# Patient Record
Sex: MALE | Race: WHITE | NOT HISPANIC OR LATINO | Employment: OTHER | URBAN - METROPOLITAN AREA
[De-identification: names, ages, dates, MRNs, and addresses within clinical notes are randomized per-mention and may not be internally consistent; named-entity substitution may affect disease eponyms.]

---

## 2019-01-01 ENCOUNTER — APPOINTMENT (OUTPATIENT)
Dept: RADIOLOGY | Facility: MEDICAL CENTER | Age: 74
DRG: 189 | End: 2019-01-01
Attending: INTERNAL MEDICINE
Payer: COMMERCIAL

## 2019-01-01 ENCOUNTER — APPOINTMENT (OUTPATIENT)
Dept: RADIOLOGY | Facility: MEDICAL CENTER | Age: 74
DRG: 189 | End: 2019-01-01
Attending: HOSPITALIST
Payer: COMMERCIAL

## 2019-01-01 ENCOUNTER — APPOINTMENT (OUTPATIENT)
Dept: CARDIOLOGY | Facility: MEDICAL CENTER | Age: 74
DRG: 189 | End: 2019-01-01
Attending: HOSPITALIST
Payer: COMMERCIAL

## 2019-01-01 ENCOUNTER — HOSPITAL ENCOUNTER (INPATIENT)
Facility: MEDICAL CENTER | Age: 74
LOS: 3 days | DRG: 189 | End: 2019-10-07
Attending: INTERNAL MEDICINE | Admitting: INTERNAL MEDICINE
Payer: COMMERCIAL

## 2019-01-01 VITALS
DIASTOLIC BLOOD PRESSURE: 65 MMHG | TEMPERATURE: 95 F | BODY MASS INDEX: 18.48 KG/M2 | HEIGHT: 69 IN | WEIGHT: 124.78 LBS | SYSTOLIC BLOOD PRESSURE: 97 MMHG

## 2019-01-01 LAB
ALBUMIN SERPL BCP-MCNC: 3.3 G/DL (ref 3.2–4.9)
ALBUMIN/GLOB SERPL: 1 G/DL
ALP SERPL-CCNC: 126 U/L (ref 30–99)
ALT SERPL-CCNC: 12 U/L (ref 2–50)
ANION GAP SERPL CALC-SCNC: 13 MMOL/L (ref 0–11.9)
ANION GAP SERPL CALC-SCNC: 15 MMOL/L (ref 0–11.9)
ANION GAP SERPL CALC-SCNC: 18 MMOL/L (ref 0–11.9)
ANION GAP SERPL CALC-SCNC: 9 MMOL/L (ref 0–11.9)
AST SERPL-CCNC: 10 U/L (ref 12–45)
BASE EXCESS BLDA CALC-SCNC: -1 MMOL/L (ref -4–3)
BASE EXCESS BLDA CALC-SCNC: -1 MMOL/L (ref -4–3)
BASE EXCESS BLDA CALC-SCNC: 2 MMOL/L (ref -4–3)
BASE EXCESS BLDA CALC-SCNC: 4 MMOL/L (ref -4–3)
BASOPHILS # BLD AUTO: 0.1 % (ref 0–1.8)
BASOPHILS # BLD AUTO: 0.2 % (ref 0–1.8)
BASOPHILS # BLD AUTO: 0.3 % (ref 0–1.8)
BASOPHILS # BLD AUTO: 0.3 % (ref 0–1.8)
BASOPHILS # BLD: 0.02 K/UL (ref 0–0.12)
BASOPHILS # BLD: 0.02 K/UL (ref 0–0.12)
BASOPHILS # BLD: 0.04 K/UL (ref 0–0.12)
BASOPHILS # BLD: 0.06 K/UL (ref 0–0.12)
BILIRUB SERPL-MCNC: 0.4 MG/DL (ref 0.1–1.5)
BODY TEMPERATURE: 37 CENTIGRADE
BODY TEMPERATURE: ABNORMAL CENTIGRADE
BUN SERPL-MCNC: 13 MG/DL (ref 8–22)
BUN SERPL-MCNC: 16 MG/DL (ref 8–22)
BUN SERPL-MCNC: 16 MG/DL (ref 8–22)
BUN SERPL-MCNC: 18 MG/DL (ref 8–22)
CALCIUM SERPL-MCNC: 8.1 MG/DL (ref 8.5–10.5)
CALCIUM SERPL-MCNC: 8.4 MG/DL (ref 8.5–10.5)
CALCIUM SERPL-MCNC: 8.5 MG/DL (ref 8.5–10.5)
CALCIUM SERPL-MCNC: 8.6 MG/DL (ref 8.5–10.5)
CHLORIDE SERPL-SCNC: 90 MMOL/L (ref 96–112)
CHLORIDE SERPL-SCNC: 91 MMOL/L (ref 96–112)
CO2 SERPL-SCNC: 21 MMOL/L (ref 20–33)
CO2 SERPL-SCNC: 23 MMOL/L (ref 20–33)
CO2 SERPL-SCNC: 23 MMOL/L (ref 20–33)
CO2 SERPL-SCNC: 24 MMOL/L (ref 20–33)
CREAT SERPL-MCNC: 0.57 MG/DL (ref 0.5–1.4)
CREAT SERPL-MCNC: 0.65 MG/DL (ref 0.5–1.4)
CREAT SERPL-MCNC: 0.71 MG/DL (ref 0.5–1.4)
CREAT SERPL-MCNC: 0.8 MG/DL (ref 0.5–1.4)
EKG IMPRESSION: NORMAL
EOSINOPHIL # BLD AUTO: 0 K/UL (ref 0–0.51)
EOSINOPHIL # BLD AUTO: 0.01 K/UL (ref 0–0.51)
EOSINOPHIL NFR BLD: 0 % (ref 0–6.9)
EOSINOPHIL NFR BLD: 0.1 % (ref 0–6.9)
ERYTHROCYTE [DISTWIDTH] IN BLOOD BY AUTOMATED COUNT: 49.2 FL (ref 35.9–50)
ERYTHROCYTE [DISTWIDTH] IN BLOOD BY AUTOMATED COUNT: 49.9 FL (ref 35.9–50)
ERYTHROCYTE [DISTWIDTH] IN BLOOD BY AUTOMATED COUNT: 50.7 FL (ref 35.9–50)
ERYTHROCYTE [DISTWIDTH] IN BLOOD BY AUTOMATED COUNT: 51.1 FL (ref 35.9–50)
GLOBULIN SER CALC-MCNC: 3.2 G/DL (ref 1.9–3.5)
GLUCOSE SERPL-MCNC: 143 MG/DL (ref 65–99)
GLUCOSE SERPL-MCNC: 145 MG/DL (ref 65–99)
GLUCOSE SERPL-MCNC: 153 MG/DL (ref 65–99)
GLUCOSE SERPL-MCNC: 156 MG/DL (ref 65–99)
HCO3 BLDA-SCNC: 20 MMOL/L (ref 17–25)
HCO3 BLDA-SCNC: 21 MMOL/L (ref 17–25)
HCO3 BLDA-SCNC: 23 MMOL/L (ref 17–25)
HCO3 BLDA-SCNC: 26 MMOL/L (ref 17–25)
HCT VFR BLD AUTO: 24.8 % (ref 42–52)
HCT VFR BLD AUTO: 28.1 % (ref 42–52)
HCT VFR BLD AUTO: 28.2 % (ref 42–52)
HCT VFR BLD AUTO: 28.9 % (ref 42–52)
HGB BLD-MCNC: 8 G/DL (ref 14–18)
HGB BLD-MCNC: 9 G/DL (ref 14–18)
HGB BLD-MCNC: 9 G/DL (ref 14–18)
HGB BLD-MCNC: 9.3 G/DL (ref 14–18)
IMM GRANULOCYTES # BLD AUTO: 0.05 K/UL (ref 0–0.11)
IMM GRANULOCYTES # BLD AUTO: 0.15 K/UL (ref 0–0.11)
IMM GRANULOCYTES NFR BLD AUTO: 0.5 % (ref 0–0.9)
IMM GRANULOCYTES NFR BLD AUTO: 0.9 % (ref 0–0.9)
IMM GRANULOCYTES NFR BLD AUTO: 0.9 % (ref 0–0.9)
IMM GRANULOCYTES NFR BLD AUTO: 1 % (ref 0–0.9)
INHALED O2 FLOW RATE: 6 L/MIN (ref 2–10)
LACTATE BLD-SCNC: 1.3 MMOL/L (ref 0.5–2)
LV EJECT FRACT  99904: 55
LV EJECT FRACT MOD 2C 99903: 61.07
LV EJECT FRACT MOD 4C 99902: 53.42
LV EJECT FRACT MOD BP 99901: 55.39
LYMPHOCYTES # BLD AUTO: 0.3 K/UL (ref 1–4.8)
LYMPHOCYTES # BLD AUTO: 0.47 K/UL (ref 1–4.8)
LYMPHOCYTES # BLD AUTO: 0.6 K/UL (ref 1–4.8)
LYMPHOCYTES # BLD AUTO: 0.62 K/UL (ref 1–4.8)
LYMPHOCYTES NFR BLD: 2.8 % (ref 22–41)
LYMPHOCYTES NFR BLD: 3.1 % (ref 22–41)
LYMPHOCYTES NFR BLD: 3.5 % (ref 22–41)
LYMPHOCYTES NFR BLD: 3.8 % (ref 22–41)
MAGNESIUM SERPL-MCNC: 1.5 MG/DL (ref 1.5–2.5)
MAGNESIUM SERPL-MCNC: 1.7 MG/DL (ref 1.5–2.5)
MCH RBC QN AUTO: 24.2 PG (ref 27–33)
MCH RBC QN AUTO: 24.6 PG (ref 27–33)
MCH RBC QN AUTO: 25 PG (ref 27–33)
MCH RBC QN AUTO: 25 PG (ref 27–33)
MCHC RBC AUTO-ENTMCNC: 31.9 G/DL (ref 33.7–35.3)
MCHC RBC AUTO-ENTMCNC: 32 G/DL (ref 33.7–35.3)
MCHC RBC AUTO-ENTMCNC: 32.2 G/DL (ref 33.7–35.3)
MCHC RBC AUTO-ENTMCNC: 32.3 G/DL (ref 33.7–35.3)
MCV RBC AUTO: 75.8 FL (ref 81.4–97.8)
MCV RBC AUTO: 76.5 FL (ref 81.4–97.8)
MCV RBC AUTO: 77.5 FL (ref 81.4–97.8)
MCV RBC AUTO: 78.1 FL (ref 81.4–97.8)
MONOCYTES # BLD AUTO: 0.19 K/UL (ref 0–0.85)
MONOCYTES # BLD AUTO: 0.44 K/UL (ref 0–0.85)
MONOCYTES # BLD AUTO: 0.8 K/UL (ref 0–0.85)
MONOCYTES # BLD AUTO: 0.81 K/UL (ref 0–0.85)
MONOCYTES NFR BLD AUTO: 1.8 % (ref 0–13.4)
MONOCYTES NFR BLD AUTO: 2.5 % (ref 0–13.4)
MONOCYTES NFR BLD AUTO: 5.1 % (ref 0–13.4)
MONOCYTES NFR BLD AUTO: 5.3 % (ref 0–13.4)
NEUTROPHILS # BLD AUTO: 10 K/UL (ref 1.82–7.42)
NEUTROPHILS # BLD AUTO: 13.51 K/UL (ref 1.82–7.42)
NEUTROPHILS # BLD AUTO: 14.26 K/UL (ref 1.82–7.42)
NEUTROPHILS # BLD AUTO: 16.29 K/UL (ref 1.82–7.42)
NEUTROPHILS NFR BLD: 89.8 % (ref 44–72)
NEUTROPHILS NFR BLD: 90.4 % (ref 44–72)
NEUTROPHILS NFR BLD: 92.8 % (ref 44–72)
NEUTROPHILS NFR BLD: 94.6 % (ref 44–72)
NRBC # BLD AUTO: 0 K/UL
NRBC BLD-RTO: 0 /100 WBC
NT-PROBNP SERPL IA-MCNC: ABNORMAL PG/ML (ref 0–125)
PCO2 BLDA: 22.6 MMHG (ref 26–37)
PCO2 BLDA: 24.1 MMHG (ref 26–37)
PCO2 BLDA: 25.5 MMHG (ref 26–37)
PCO2 BLDA: 28.1 MMHG (ref 26–37)
PCO2 TEMP ADJ BLDA: 24.1 MMHG (ref 26–37)
PH BLDA: 7.55 [PH] (ref 7.4–7.5)
PH BLDA: 7.56 [PH] (ref 7.4–7.5)
PH BLDA: 7.57 [PH] (ref 7.4–7.5)
PH BLDA: 7.58 [PH] (ref 7.4–7.5)
PH TEMP ADJ BLDA: 7.55 [PH] (ref 7.4–7.5)
PHOSPHATE SERPL-MCNC: 2.9 MG/DL (ref 2.5–4.5)
PHOSPHATE SERPL-MCNC: 3.9 MG/DL (ref 2.5–4.5)
PLATELET # BLD AUTO: 398 K/UL (ref 164–446)
PLATELET # BLD AUTO: 456 K/UL (ref 164–446)
PLATELET # BLD AUTO: 481 K/UL (ref 164–446)
PLATELET # BLD AUTO: 510 K/UL (ref 164–446)
PMV BLD AUTO: 10.3 FL (ref 9–12.9)
PMV BLD AUTO: 10.9 FL (ref 9–12.9)
PMV BLD AUTO: 9 FL (ref 9–12.9)
PMV BLD AUTO: 9.6 FL (ref 9–12.9)
PO2 BLDA: 53.3 MMHG (ref 64–87)
PO2 BLDA: 54.1 MMHG (ref 64–87)
PO2 BLDA: 56.1 MMHG (ref 64–87)
PO2 BLDA: 58.4 MMHG (ref 64–87)
PO2 TEMP ADJ BLDA: 53.3 MMHG (ref 64–87)
POTASSIUM SERPL-SCNC: 3 MMOL/L (ref 3.6–5.5)
POTASSIUM SERPL-SCNC: 4 MMOL/L (ref 3.6–5.5)
POTASSIUM SERPL-SCNC: 4 MMOL/L (ref 3.6–5.5)
POTASSIUM SERPL-SCNC: 4.6 MMOL/L (ref 3.6–5.5)
PROCALCITONIN SERPL-MCNC: 7.53 NG/ML
PROT SERPL-MCNC: 6.5 G/DL (ref 6–8.2)
RBC # BLD AUTO: 3.2 M/UL (ref 4.7–6.1)
RBC # BLD AUTO: 3.6 M/UL (ref 4.7–6.1)
RBC # BLD AUTO: 3.72 M/UL (ref 4.7–6.1)
RBC # BLD AUTO: 3.78 M/UL (ref 4.7–6.1)
SAO2 % BLDA: 87.9 % (ref 93–99)
SAO2 % BLDA: 88.9 % (ref 93–99)
SAO2 % BLDA: 90.6 % (ref 93–99)
SAO2 % BLDA: 90.8 % (ref 93–99)
SODIUM SERPL-SCNC: 123 MMOL/L (ref 135–145)
SODIUM SERPL-SCNC: 124 MMOL/L (ref 135–145)
SODIUM SERPL-SCNC: 129 MMOL/L (ref 135–145)
SODIUM SERPL-SCNC: 131 MMOL/L (ref 135–145)
TROPONIN T SERPL-MCNC: 20 NG/L (ref 6–19)
TROPONIN T SERPL-MCNC: 23 NG/L (ref 6–19)
TROPONIN T SERPL-MCNC: 26 NG/L (ref 6–19)
WBC # BLD AUTO: 10.6 K/UL (ref 4.8–10.8)
WBC # BLD AUTO: 15 K/UL (ref 4.8–10.8)
WBC # BLD AUTO: 15.9 K/UL (ref 4.8–10.8)
WBC # BLD AUTO: 17.6 K/UL (ref 4.8–10.8)

## 2019-01-01 PROCEDURE — 92610 EVALUATE SWALLOWING FUNCTION: CPT

## 2019-01-01 PROCEDURE — 700111 HCHG RX REV CODE 636 W/ 250 OVERRIDE (IP): Performed by: HOSPITALIST

## 2019-01-01 PROCEDURE — A9270 NON-COVERED ITEM OR SERVICE: HCPCS | Performed by: HOSPITALIST

## 2019-01-01 PROCEDURE — 71275 CT ANGIOGRAPHY CHEST: CPT

## 2019-01-01 PROCEDURE — 71045 X-RAY EXAM CHEST 1 VIEW: CPT

## 2019-01-01 PROCEDURE — 83735 ASSAY OF MAGNESIUM: CPT

## 2019-01-01 PROCEDURE — 770022 HCHG ROOM/CARE - ICU (200)

## 2019-01-01 PROCEDURE — 99291 CRITICAL CARE FIRST HOUR: CPT | Performed by: HOSPITALIST

## 2019-01-01 PROCEDURE — 84100 ASSAY OF PHOSPHORUS: CPT

## 2019-01-01 PROCEDURE — 700105 HCHG RX REV CODE 258: Performed by: HOSPITALIST

## 2019-01-01 PROCEDURE — C9113 INJ PANTOPRAZOLE SODIUM, VIA: HCPCS | Performed by: HOSPITALIST

## 2019-01-01 PROCEDURE — 85025 COMPLETE CBC W/AUTO DIFF WBC: CPT

## 2019-01-01 PROCEDURE — 84484 ASSAY OF TROPONIN QUANT: CPT

## 2019-01-01 PROCEDURE — 94640 AIRWAY INHALATION TREATMENT: CPT

## 2019-01-01 PROCEDURE — 700105 HCHG RX REV CODE 258: Performed by: INTERNAL MEDICINE

## 2019-01-01 PROCEDURE — 700101 HCHG RX REV CODE 250: Performed by: HOSPITALIST

## 2019-01-01 PROCEDURE — 93306 TTE W/DOPPLER COMPLETE: CPT

## 2019-01-01 PROCEDURE — 93010 ELECTROCARDIOGRAM REPORT: CPT | Performed by: INTERNAL MEDICINE

## 2019-01-01 PROCEDURE — 99291 CRITICAL CARE FIRST HOUR: CPT | Performed by: INTERNAL MEDICINE

## 2019-01-01 PROCEDURE — 302128 INFUSION PUMP W/POLE: Performed by: HOSPITALIST

## 2019-01-01 PROCEDURE — 770020 HCHG ROOM/CARE - TELE (206)

## 2019-01-01 PROCEDURE — 5A09357 ASSISTANCE WITH RESPIRATORY VENTILATION, LESS THAN 24 CONSECUTIVE HOURS, CONTINUOUS POSITIVE AIRWAY PRESSURE: ICD-10-PCS | Performed by: INTERNAL MEDICINE

## 2019-01-01 PROCEDURE — 94002 VENT MGMT INPAT INIT DAY: CPT

## 2019-01-01 PROCEDURE — 99292 CRITICAL CARE ADDL 30 MIN: CPT | Performed by: INTERNAL MEDICINE

## 2019-01-01 PROCEDURE — 80048 BASIC METABOLIC PNL TOTAL CA: CPT

## 2019-01-01 PROCEDURE — 82803 BLOOD GASES ANY COMBINATION: CPT

## 2019-01-01 PROCEDURE — 80053 COMPREHEN METABOLIC PANEL: CPT

## 2019-01-01 PROCEDURE — 700111 HCHG RX REV CODE 636 W/ 250 OVERRIDE (IP): Performed by: INTERNAL MEDICINE

## 2019-01-01 PROCEDURE — 700101 HCHG RX REV CODE 250: Performed by: INTERNAL MEDICINE

## 2019-01-01 PROCEDURE — 700102 HCHG RX REV CODE 250 W/ 637 OVERRIDE(OP): Performed by: HOSPITALIST

## 2019-01-01 PROCEDURE — 84145 PROCALCITONIN (PCT): CPT

## 2019-01-01 PROCEDURE — 51798 US URINE CAPACITY MEASURE: CPT

## 2019-01-01 PROCEDURE — 700117 HCHG RX CONTRAST REV CODE 255: Performed by: HOSPITALIST

## 2019-01-01 PROCEDURE — 700105 HCHG RX REV CODE 258

## 2019-01-01 PROCEDURE — 99233 SBSQ HOSP IP/OBS HIGH 50: CPT | Performed by: HOSPITALIST

## 2019-01-01 PROCEDURE — 93005 ELECTROCARDIOGRAM TRACING: CPT | Performed by: HOSPITALIST

## 2019-01-01 PROCEDURE — 36415 COLL VENOUS BLD VENIPUNCTURE: CPT

## 2019-01-01 PROCEDURE — 93306 TTE W/DOPPLER COMPLETE: CPT | Mod: 26 | Performed by: INTERNAL MEDICINE

## 2019-01-01 PROCEDURE — 99223 1ST HOSP IP/OBS HIGH 75: CPT | Mod: AI | Performed by: HOSPITALIST

## 2019-01-01 PROCEDURE — 83605 ASSAY OF LACTIC ACID: CPT

## 2019-01-01 PROCEDURE — 93005 ELECTROCARDIOGRAM TRACING: CPT | Performed by: INTERNAL MEDICINE

## 2019-01-01 PROCEDURE — 83880 ASSAY OF NATRIURETIC PEPTIDE: CPT

## 2019-01-01 RX ORDER — HYDRALAZINE HYDROCHLORIDE 20 MG/ML
20 INJECTION INTRAMUSCULAR; INTRAVENOUS EVERY 6 HOURS PRN
Status: DISCONTINUED | OUTPATIENT
Start: 2019-01-01 | End: 2019-01-01

## 2019-01-01 RX ORDER — LORAZEPAM 2 MG/ML
1 CONCENTRATE ORAL
Status: DISCONTINUED | OUTPATIENT
Start: 2019-01-01 | End: 2019-01-01 | Stop reason: HOSPADM

## 2019-01-01 RX ORDER — FUROSEMIDE 10 MG/ML
40 INJECTION INTRAMUSCULAR; INTRAVENOUS ONCE
Status: COMPLETED | OUTPATIENT
Start: 2019-01-01 | End: 2019-01-01

## 2019-01-01 RX ORDER — POTASSIUM CHLORIDE 1.5 G/1.58G
20 POWDER, FOR SOLUTION ORAL DAILY
COMMUNITY

## 2019-01-01 RX ORDER — FUROSEMIDE 10 MG/ML
40 INJECTION INTRAMUSCULAR; INTRAVENOUS
Status: DISCONTINUED | OUTPATIENT
Start: 2019-01-01 | End: 2019-01-01

## 2019-01-01 RX ORDER — KETOROLAC TROMETHAMINE 30 MG/ML
30 INJECTION, SOLUTION INTRAMUSCULAR; INTRAVENOUS EVERY 6 HOURS PRN
Status: DISCONTINUED | OUTPATIENT
Start: 2019-01-01 | End: 2019-01-01

## 2019-01-01 RX ORDER — SODIUM CHLORIDE, SODIUM LACTATE, POTASSIUM CHLORIDE, AND CALCIUM CHLORIDE .6; .31; .03; .02 G/100ML; G/100ML; G/100ML; G/100ML
500 INJECTION, SOLUTION INTRAVENOUS ONCE
Status: COMPLETED | OUTPATIENT
Start: 2019-01-01 | End: 2019-01-01

## 2019-01-01 RX ORDER — CALCIUM CARBONATE 500 MG/1
500 TABLET, CHEWABLE ORAL 3 TIMES DAILY PRN
COMMUNITY

## 2019-01-01 RX ORDER — IPRATROPIUM BROMIDE AND ALBUTEROL SULFATE 2.5; .5 MG/3ML; MG/3ML
3 SOLUTION RESPIRATORY (INHALATION)
Status: DISCONTINUED | OUTPATIENT
Start: 2019-01-01 | End: 2019-01-01

## 2019-01-01 RX ORDER — HYDROCODONE BITARTRATE AND ACETAMINOPHEN 5; 325 MG/1; MG/1
1 TABLET ORAL EVERY 6 HOURS PRN
Status: DISCONTINUED | OUTPATIENT
Start: 2019-01-01 | End: 2019-01-01

## 2019-01-01 RX ORDER — LORAZEPAM 2 MG/ML
1 INJECTION INTRAMUSCULAR
Status: DISCONTINUED | OUTPATIENT
Start: 2019-01-01 | End: 2019-01-01 | Stop reason: HOSPADM

## 2019-01-01 RX ORDER — DILTIAZEM HYDROCHLORIDE 5 MG/ML
10 INJECTION INTRAVENOUS ONCE
Status: COMPLETED | OUTPATIENT
Start: 2019-01-01 | End: 2019-01-01

## 2019-01-01 RX ORDER — METOPROLOL TARTRATE 1 MG/ML
5 INJECTION, SOLUTION INTRAVENOUS EVERY 6 HOURS
Status: DISCONTINUED | OUTPATIENT
Start: 2019-01-01 | End: 2019-01-01

## 2019-01-01 RX ORDER — DILTIAZEM HYDROCHLORIDE 5 MG/ML
20 INJECTION INTRAVENOUS ONCE
Status: COMPLETED | OUTPATIENT
Start: 2019-01-01 | End: 2019-01-01

## 2019-01-01 RX ORDER — LEVALBUTEROL INHALATION SOLUTION 0.63 MG/3ML
0.63 SOLUTION RESPIRATORY (INHALATION)
Status: DISCONTINUED | OUTPATIENT
Start: 2019-01-01 | End: 2019-01-01

## 2019-01-01 RX ORDER — IBUPROFEN 200 MG
500 CAPSULE ORAL DAILY
COMMUNITY

## 2019-01-01 RX ORDER — DOXYCYCLINE 100 MG/1
100 TABLET ORAL EVERY 12 HOURS
Status: DISCONTINUED | OUTPATIENT
Start: 2019-01-01 | End: 2019-01-01

## 2019-01-01 RX ORDER — MAGNESIUM SULFATE 1 G/100ML
1 INJECTION INTRAVENOUS ONCE
Status: COMPLETED | OUTPATIENT
Start: 2019-01-01 | End: 2019-01-01

## 2019-01-01 RX ORDER — POLYETHYLENE GLYCOL 3350 17 G/17G
1 POWDER, FOR SOLUTION ORAL
Status: DISCONTINUED | OUTPATIENT
Start: 2019-01-01 | End: 2019-01-01

## 2019-01-01 RX ORDER — PANTOPRAZOLE SODIUM 40 MG/10ML
40 INJECTION, POWDER, LYOPHILIZED, FOR SOLUTION INTRAVENOUS DAILY
Status: DISCONTINUED | OUTPATIENT
Start: 2019-01-01 | End: 2019-01-01

## 2019-01-01 RX ORDER — MORPHINE SULFATE 10 MG/ML
10 INJECTION, SOLUTION INTRAMUSCULAR; INTRAVENOUS
Status: DISCONTINUED | OUTPATIENT
Start: 2019-01-01 | End: 2019-01-01 | Stop reason: HOSPADM

## 2019-01-01 RX ORDER — ONDANSETRON 4 MG/1
4 TABLET, ORALLY DISINTEGRATING ORAL EVERY 4 HOURS PRN
Status: DISCONTINUED | OUTPATIENT
Start: 2019-01-01 | End: 2019-01-01

## 2019-01-01 RX ORDER — ENALAPRILAT 1.25 MG/ML
1.25-2.5 INJECTION INTRAVENOUS EVERY 6 HOURS PRN
Status: DISCONTINUED | OUTPATIENT
Start: 2019-01-01 | End: 2019-01-01

## 2019-01-01 RX ORDER — MORPHINE SULFATE 10 MG/ML
5 INJECTION, SOLUTION INTRAMUSCULAR; INTRAVENOUS
Status: DISCONTINUED | OUTPATIENT
Start: 2019-01-01 | End: 2019-01-01 | Stop reason: HOSPADM

## 2019-01-01 RX ORDER — SODIUM CHLORIDE 9 MG/ML
INJECTION, SOLUTION INTRAVENOUS CONTINUOUS
Status: DISCONTINUED | OUTPATIENT
Start: 2019-01-01 | End: 2019-01-01

## 2019-01-01 RX ORDER — SODIUM CHLORIDE 9 MG/ML
INJECTION, SOLUTION INTRAVENOUS
Status: COMPLETED
Start: 2019-01-01 | End: 2019-01-01

## 2019-01-01 RX ORDER — AMOXICILLIN 250 MG
2 CAPSULE ORAL 2 TIMES DAILY
Status: DISCONTINUED | OUTPATIENT
Start: 2019-01-01 | End: 2019-01-01

## 2019-01-01 RX ORDER — MAGNESIUM OXIDE 400 MG/1
420 TABLET ORAL DAILY
COMMUNITY

## 2019-01-01 RX ORDER — MAGNESIUM SULFATE HEPTAHYDRATE 40 MG/ML
2 INJECTION, SOLUTION INTRAVENOUS ONCE
Status: COMPLETED | OUTPATIENT
Start: 2019-01-01 | End: 2019-01-01

## 2019-01-01 RX ORDER — FUROSEMIDE 20 MG/1
20 TABLET ORAL DAILY
COMMUNITY

## 2019-01-01 RX ORDER — ONDANSETRON 2 MG/ML
4 INJECTION INTRAMUSCULAR; INTRAVENOUS EVERY 4 HOURS PRN
Status: DISCONTINUED | OUTPATIENT
Start: 2019-01-01 | End: 2019-01-01

## 2019-01-01 RX ORDER — ATROPINE SULFATE 10 MG/ML
2 SOLUTION/ DROPS OPHTHALMIC EVERY 4 HOURS PRN
Status: DISCONTINUED | OUTPATIENT
Start: 2019-01-01 | End: 2019-01-01 | Stop reason: HOSPADM

## 2019-01-01 RX ORDER — MEGESTROL ACETATE 40 MG/ML
400 SUSPENSION ORAL DAILY
COMMUNITY

## 2019-01-01 RX ORDER — FERROUS SULFATE 325(65) MG
325 TABLET ORAL EVERY EVENING
COMMUNITY

## 2019-01-01 RX ORDER — HEPARIN SODIUM 5000 [USP'U]/ML
5000 INJECTION, SOLUTION INTRAVENOUS; SUBCUTANEOUS EVERY 8 HOURS
Status: DISCONTINUED | OUTPATIENT
Start: 2019-01-01 | End: 2019-01-01

## 2019-01-01 RX ORDER — METOPROLOL TARTRATE 1 MG/ML
5 INJECTION, SOLUTION INTRAVENOUS ONCE
Status: COMPLETED | OUTPATIENT
Start: 2019-01-01 | End: 2019-01-01

## 2019-01-01 RX ORDER — M-VIT,TX,IRON,MINS/CALC/FOLIC 27MG-0.4MG
1 TABLET ORAL DAILY
COMMUNITY

## 2019-01-01 RX ORDER — MIRTAZAPINE 15 MG/1
15 TABLET, FILM COATED ORAL NIGHTLY
COMMUNITY

## 2019-01-01 RX ORDER — CARVEDILOL 12.5 MG/1
12.5 TABLET ORAL 2 TIMES DAILY
COMMUNITY

## 2019-01-01 RX ORDER — BISACODYL 10 MG
10 SUPPOSITORY, RECTAL RECTAL
Status: DISCONTINUED | OUTPATIENT
Start: 2019-01-01 | End: 2019-01-01

## 2019-01-01 RX ORDER — ACETAMINOPHEN 325 MG/1
650 TABLET ORAL EVERY 6 HOURS PRN
Status: DISCONTINUED | OUTPATIENT
Start: 2019-01-01 | End: 2019-01-01

## 2019-01-01 RX ORDER — GABAPENTIN 100 MG/1
300 CAPSULE ORAL 3 TIMES DAILY
COMMUNITY

## 2019-01-01 RX ORDER — POTASSIUM CHLORIDE 7.45 MG/ML
10 INJECTION INTRAVENOUS
Status: COMPLETED | OUTPATIENT
Start: 2019-01-01 | End: 2019-01-01

## 2019-01-01 RX ADMIN — IPRATROPIUM BROMIDE AND ALBUTEROL SULFATE 3 ML: .5; 3 SOLUTION RESPIRATORY (INHALATION) at 07:00

## 2019-01-01 RX ADMIN — AMPICILLIN AND SULBACTAM 3 G: 2; 1 INJECTION, POWDER, FOR SOLUTION INTRAVENOUS at 06:20

## 2019-01-01 RX ADMIN — LIDOCAINE HYDROCHLORIDE 15 ML: 20 SOLUTION OROPHARYNGEAL at 18:47

## 2019-01-01 RX ADMIN — AMPICILLIN AND SULBACTAM 3 G: 2; 1 INJECTION, POWDER, FOR SOLUTION INTRAVENOUS at 00:46

## 2019-01-01 RX ADMIN — IOHEXOL 60 ML: 350 INJECTION, SOLUTION INTRAVENOUS at 02:56

## 2019-01-01 RX ADMIN — MORPHINE SULFATE 10 MG: 10 INJECTION INTRAVENOUS at 00:23

## 2019-01-01 RX ADMIN — FUROSEMIDE 40 MG: 10 INJECTION, SOLUTION INTRAMUSCULAR; INTRAVENOUS at 09:43

## 2019-01-01 RX ADMIN — MAGNESIUM SULFATE IN WATER 2 G: 40 INJECTION, SOLUTION INTRAVENOUS at 12:18

## 2019-01-01 RX ADMIN — KETOROLAC TROMETHAMINE 30 MG: 30 INJECTION, SOLUTION INTRAMUSCULAR at 04:33

## 2019-01-01 RX ADMIN — DOXYCYCLINE 100 MG: 100 TABLET, FILM COATED ORAL at 13:13

## 2019-01-01 RX ADMIN — HEPARIN SODIUM 5000 UNITS: 5000 INJECTION INTRAVENOUS; SUBCUTANEOUS at 14:38

## 2019-01-01 RX ADMIN — HYDROCODONE BITARTRATE AND ACETAMINOPHEN 1 TABLET: 5; 325 TABLET ORAL at 12:07

## 2019-01-01 RX ADMIN — AMPICILLIN AND SULBACTAM 3 G: 2; 1 INJECTION, POWDER, FOR SOLUTION INTRAVENOUS at 13:08

## 2019-01-01 RX ADMIN — SODIUM CHLORIDE 500 ML: 9 INJECTION, SOLUTION INTRAVENOUS at 17:31

## 2019-01-01 RX ADMIN — HEPARIN SODIUM 5000 UNITS: 5000 INJECTION INTRAVENOUS; SUBCUTANEOUS at 15:33

## 2019-01-01 RX ADMIN — POTASSIUM CHLORIDE 10 MEQ: 7.46 INJECTION, SOLUTION INTRAVENOUS at 15:32

## 2019-01-01 RX ADMIN — DILTIAZEM HYDROCHLORIDE 20 MG: 5 INJECTION INTRAVENOUS at 21:09

## 2019-01-01 RX ADMIN — DILTIAZEM HYDROCHLORIDE 10 MG: 5 INJECTION INTRAVENOUS at 22:12

## 2019-01-01 RX ADMIN — METOPROLOL TARTRATE 5 MG: 5 INJECTION, SOLUTION INTRAVENOUS at 23:17

## 2019-01-01 RX ADMIN — ACETAMINOPHEN 650 MG: 325 TABLET, FILM COATED ORAL at 08:50

## 2019-01-01 RX ADMIN — POTASSIUM CHLORIDE 10 MEQ: 7.46 INJECTION, SOLUTION INTRAVENOUS at 14:30

## 2019-01-01 RX ADMIN — SODIUM CHLORIDE: 9 INJECTION, SOLUTION INTRAVENOUS at 07:25

## 2019-01-01 RX ADMIN — MORPHINE SULFATE 10 MG: 10 INJECTION INTRAVENOUS at 10:29

## 2019-01-01 RX ADMIN — IPRATROPIUM BROMIDE AND ALBUTEROL SULFATE 3 ML: .5; 3 SOLUTION RESPIRATORY (INHALATION) at 14:52

## 2019-01-01 RX ADMIN — AMPICILLIN AND SULBACTAM 3 G: 2; 1 INJECTION, POWDER, FOR SOLUTION INTRAVENOUS at 09:34

## 2019-01-01 RX ADMIN — DOXYCYCLINE 100 MG: 100 INJECTION, POWDER, LYOPHILIZED, FOR SOLUTION INTRAVENOUS at 03:49

## 2019-01-01 RX ADMIN — FUROSEMIDE 40 MG: 10 INJECTION, SOLUTION INTRAMUSCULAR; INTRAVENOUS at 22:12

## 2019-01-01 RX ADMIN — LORAZEPAM 1 MG: 2 INJECTION INTRAMUSCULAR; INTRAVENOUS at 01:07

## 2019-01-01 RX ADMIN — PANTOPRAZOLE SODIUM 40 MG: 40 INJECTION, POWDER, FOR SOLUTION INTRAVENOUS at 08:24

## 2019-01-01 RX ADMIN — POTASSIUM CHLORIDE 10 MEQ: 7.46 INJECTION, SOLUTION INTRAVENOUS at 13:19

## 2019-01-01 RX ADMIN — FUROSEMIDE 40 MG: 10 INJECTION, SOLUTION INTRAMUSCULAR; INTRAVENOUS at 17:02

## 2019-01-01 RX ADMIN — METOPROLOL TARTRATE 5 MG: 5 INJECTION, SOLUTION INTRAVENOUS at 18:48

## 2019-01-01 RX ADMIN — AMPICILLIN AND SULBACTAM 3 G: 2; 1 INJECTION, POWDER, FOR SOLUTION INTRAVENOUS at 17:31

## 2019-01-01 RX ADMIN — AMPICILLIN AND SULBACTAM 3 G: 2; 1 INJECTION, POWDER, FOR SOLUTION INTRAVENOUS at 17:01

## 2019-01-01 RX ADMIN — HEPARIN SODIUM 5000 UNITS: 5000 INJECTION INTRAVENOUS; SUBCUTANEOUS at 06:52

## 2019-01-01 RX ADMIN — AMPICILLIN AND SULBACTAM 3 G: 2; 1 INJECTION, POWDER, FOR SOLUTION INTRAVENOUS at 08:21

## 2019-01-01 RX ADMIN — HEPARIN SODIUM 5000 UNITS: 5000 INJECTION INTRAVENOUS; SUBCUTANEOUS at 21:14

## 2019-01-01 RX ADMIN — FUROSEMIDE 40 MG: 10 INJECTION, SOLUTION INTRAMUSCULAR; INTRAVENOUS at 06:52

## 2019-01-01 RX ADMIN — MAGNESIUM SULFATE IN DEXTROSE 1 G: 10 INJECTION, SOLUTION INTRAVENOUS at 22:05

## 2019-01-01 RX ADMIN — POTASSIUM CHLORIDE 10 MEQ: 7.46 INJECTION, SOLUTION INTRAVENOUS at 12:19

## 2019-01-01 RX ADMIN — NITROGLYCERIN 0.5 INCH: 20 OINTMENT TOPICAL at 03:58

## 2019-01-01 RX ADMIN — METOPROLOL TARTRATE 5 MG: 5 INJECTION, SOLUTION INTRAVENOUS at 15:08

## 2019-01-01 RX ADMIN — AMPICILLIN AND SULBACTAM 3 G: 2; 1 INJECTION, POWDER, FOR SOLUTION INTRAVENOUS at 12:07

## 2019-01-01 RX ADMIN — FUROSEMIDE 40 MG: 10 INJECTION, SOLUTION INTRAMUSCULAR; INTRAVENOUS at 15:17

## 2019-01-01 RX ADMIN — MORPHINE SULFATE 10 MG: 10 INJECTION INTRAVENOUS at 03:41

## 2019-01-01 RX ADMIN — SODIUM CHLORIDE, POTASSIUM CHLORIDE, SODIUM LACTATE AND CALCIUM CHLORIDE 500 ML: 600; 310; 30; 20 INJECTION, SOLUTION INTRAVENOUS at 15:11

## 2019-01-01 RX ADMIN — HEPARIN SODIUM 5000 UNITS: 5000 INJECTION INTRAVENOUS; SUBCUTANEOUS at 22:04

## 2019-01-01 RX ADMIN — FUROSEMIDE 40 MG: 10 INJECTION, SOLUTION INTRAMUSCULAR; INTRAVENOUS at 19:51

## 2019-01-01 RX ADMIN — IPRATROPIUM BROMIDE AND ALBUTEROL SULFATE 3 ML: .5; 3 SOLUTION RESPIRATORY (INHALATION) at 17:54

## 2019-01-01 RX ADMIN — DOXYCYCLINE 100 MG: 100 INJECTION, POWDER, LYOPHILIZED, FOR SOLUTION INTRAVENOUS at 18:54

## 2019-01-01 RX ADMIN — AMPICILLIN AND SULBACTAM 3 G: 2; 1 INJECTION, POWDER, FOR SOLUTION INTRAVENOUS at 00:51

## 2019-01-01 RX ADMIN — ENALAPRILAT 1.25 MG: 1.25 INJECTION INTRAVENOUS at 18:57

## 2019-01-01 RX ADMIN — HYDROCODONE BITARTRATE AND ACETAMINOPHEN 1 TABLET: 5; 325 TABLET ORAL at 19:34

## 2019-01-01 RX ADMIN — AMPICILLIN AND SULBACTAM 3 G: 2; 1 INJECTION, POWDER, FOR SOLUTION INTRAVENOUS at 18:50

## 2019-01-01 ASSESSMENT — COGNITIVE AND FUNCTIONAL STATUS - GENERAL
SUGGESTED CMS G CODE MODIFIER DAILY ACTIVITY: CL
MOBILITY SCORE: 11
STANDING UP FROM CHAIR USING ARMS: A LOT
DRESSING REGULAR UPPER BODY CLOTHING: A LOT
PERSONAL GROOMING: A LOT
CLIMB 3 TO 5 STEPS WITH RAILING: TOTAL
DAILY ACTIVITIY SCORE: 13
TOILETING: A LOT
EATING MEALS: A LITTLE
WALKING IN HOSPITAL ROOM: A LOT
MOVING TO AND FROM BED TO CHAIR: A LOT
TURNING FROM BACK TO SIDE WHILE IN FLAT BAD: A LOT
HELP NEEDED FOR BATHING: A LOT
MOVING FROM LYING ON BACK TO SITTING ON SIDE OF FLAT BED: A LOT
SUGGESTED CMS G CODE MODIFIER MOBILITY: CL
DRESSING REGULAR LOWER BODY CLOTHING: A LOT

## 2019-01-01 ASSESSMENT — ENCOUNTER SYMPTOMS
MYALGIAS: 0
WHEEZING: 1
DEPRESSION: 0
SHORTNESS OF BREATH: 1
DIAPHORESIS: 1
NECK PAIN: 0
ORTHOPNEA: 0
VOMITING: 0
DEPRESSION: 0
PALPITATIONS: 0
CHILLS: 0
TINGLING: 0
SINUS PAIN: 0
HEMOPTYSIS: 0
DIZZINESS: 0
CHILLS: 0
ORTHOPNEA: 0
HEARTBURN: 0
BACK PAIN: 0
PALPITATIONS: 0
HEMOPTYSIS: 0
WHEEZING: 1
NAUSEA: 0
NECK PAIN: 0
PALPITATIONS: 0
HEMOPTYSIS: 0
SHORTNESS OF BREATH: 1
FEVER: 0
HEADACHES: 0
ORTHOPNEA: 0
HEARTBURN: 0
DIZZINESS: 0
FEVER: 0
SPUTUM PRODUCTION: 0
VOMITING: 0
BRUISES/BLEEDS EASILY: 0
TINGLING: 0
TREMORS: 0
COUGH: 0
MYALGIAS: 0
NAUSEA: 0
CHILLS: 0
HEADACHES: 0
BLURRED VISION: 0
BRUISES/BLEEDS EASILY: 0
COUGH: 1
VOMITING: 0
FALLS: 1
BACK PAIN: 0
SHORTNESS OF BREATH: 1
NAUSEA: 0
COUGH: 1
DIZZINESS: 0
TREMORS: 0
BLURRED VISION: 0
SINUS PAIN: 0

## 2019-01-01 ASSESSMENT — PATIENT HEALTH QUESTIONNAIRE - PHQ9
2. FEELING DOWN, DEPRESSED, IRRITABLE, OR HOPELESS: NOT AT ALL
SUM OF ALL RESPONSES TO PHQ9 QUESTIONS 1 AND 2: 0
2. FEELING DOWN, DEPRESSED, IRRITABLE, OR HOPELESS: NOT AT ALL
SUM OF ALL RESPONSES TO PHQ9 QUESTIONS 1 AND 2: 0
1. LITTLE INTEREST OR PLEASURE IN DOING THINGS: NOT AT ALL
1. LITTLE INTEREST OR PLEASURE IN DOING THINGS: NOT AT ALL

## 2019-01-01 ASSESSMENT — LIFESTYLE VARIABLES
ON A TYPICAL DAY WHEN YOU DRINK ALCOHOL HOW MANY DRINKS DO YOU HAVE: 0
DOES PATIENT WANT TO STOP DRINKING: NO
EVER_SMOKED: YES
EVER FELT BAD OR GUILTY ABOUT YOUR DRINKING: NO
TOTAL SCORE: 0
TOTAL SCORE: 0
ALCOHOL_USE: NO
EVER HAD A DRINK FIRST THING IN THE MORNING TO STEADY YOUR NERVES TO GET RID OF A HANGOVER: NO
EVER_SMOKED: YES
TOTAL SCORE: 0
HOW MANY TIMES IN THE PAST YEAR HAVE YOU HAD 5 OR MORE DRINKS IN A DAY: 0
CONSUMPTION TOTAL: NEGATIVE
HAVE YOU EVER FELT YOU SHOULD CUT DOWN ON YOUR DRINKING: NO
AVERAGE NUMBER OF DAYS PER WEEK YOU HAVE A DRINK CONTAINING ALCOHOL: 0
HAVE PEOPLE ANNOYED YOU BY CRITICIZING YOUR DRINKING: NO

## 2019-01-01 ASSESSMENT — COPD QUESTIONNAIRES
DURING THE PAST 4 WEEKS HOW MUCH DID YOU FEEL SHORT OF BREATH: SOME OF THE TIME
COPD SCREENING SCORE: 6
HAVE YOU SMOKED AT LEAST 100 CIGARETTES IN YOUR ENTIRE LIFE: YES
DO YOU EVER COUGH UP ANY MUCUS OR PHLEGM?: YES, A FEW DAYS A WEEK OR MONTH
HAVE YOU SMOKED AT LEAST 100 CIGARETTES IN YOUR ENTIRE LIFE: YES
DURING THE PAST 4 WEEKS HOW MUCH DID YOU FEEL SHORT OF BREATH: SOME OF THE TIME
IN THE PAST 12 MONTHS DO YOU DO LESS THAN YOU USED TO BECAUSE OF YOUR BREATHING PROBLEMS: DISAGREE/UNSURE
COPD SCREENING SCORE: 6
DO YOU EVER COUGH UP ANY MUCUS OR PHLEGM?: YES, A FEW DAYS A WEEK OR MONTH

## 2019-01-01 ASSESSMENT — PULMONARY FUNCTION TESTS: EPAP_CMH2O: 5

## 2019-10-04 PROBLEM — E87.1 HYPONATREMIA: Status: ACTIVE | Noted: 2019-01-01

## 2019-10-04 PROBLEM — J18.9 PNEUMONIA: Status: ACTIVE | Noted: 2019-01-01

## 2019-10-04 PROBLEM — D64.9 ANEMIA: Status: ACTIVE | Noted: 2019-01-01

## 2019-10-04 PROBLEM — J44.9 COPD (CHRONIC OBSTRUCTIVE PULMONARY DISEASE) (HCC): Status: ACTIVE | Noted: 2019-01-01

## 2019-10-04 PROBLEM — J96.01 ACUTE RESPIRATORY FAILURE WITH HYPOXIA (HCC): Status: ACTIVE | Noted: 2019-01-01

## 2019-10-04 PROBLEM — C34.90 LUNG CANCER (HCC): Status: ACTIVE | Noted: 2019-01-01

## 2019-10-04 PROBLEM — I50.9 CONGESTIVE HEART FAILURE (CHF) (HCC): Status: ACTIVE | Noted: 2019-01-01

## 2019-10-04 NOTE — PROGRESS NOTES
Patient arrived via [facility] on 6L nasal cannula. Attached cardiac monitor, pulse ox, and BP cuff. 93% on 6L. A&O x4. CHG bath given. Skin assessed, pictures taken and documented. No c/o pain at this time. Oriented to room, unit,  use of call light, and plan of care. Hospitalist and intensivist paged to notify of patient arrival. Orders rec'd to draw labs and start fluids. No further needs at this time.

## 2019-10-04 NOTE — H&P
Hospital Medicine History & Physical Note    Date of Service  10/4/2019    Primary Care Physician  Tim Matt M.D.    Consultants  None    Code Status  DNAR/DNI    Chief Complaint  Shortness of breath    History of Presenting Illness  73 y.o. male who presented 10/4/2019 with shortness of breath.    I have reviewed some of the recent provider documentation available to me in the patient's medical chart from the transferring hospital.  Records are briefly summarized: The patient was evaluated at the [facility] hospital emergency room for shortness of breath.  The patient has a history of lung cancer that was recently diagnosed, work-up and plans for treatment are being made through the [facility].  At the [facility] hospital emergency room patient was found to have elevated BNP and was anemic.  He was treated with lasix, ceftriaxone and doxycycline.  For his anemia he received TXA and a dose of IV pantoprazole.  Occult blood testing was negative     The patient is interviewed in the ICU after his arrival here at [facility].  He says that he became acutely short of breath yesterday, he denies orthopnea, denies swelling in his lower extremities.  He complained of some chest tightness earlier this morning but during my interview he denies any chest pain or discomfort.  He endorses cough and malaise, he has not had a fever, he denies sick contacts.  Patient indicates that he feels significantly better on oxygen.    Review of Systems  Review of Systems   Constitutional: Negative for chills and malaise/fatigue.   Respiratory: Positive for cough and shortness of breath. Negative for hemoptysis and sputum production.    Cardiovascular: Negative for chest pain, palpitations and orthopnea.   Gastrointestinal: Negative for nausea and vomiting.   Musculoskeletal: Positive for falls.   Skin: Negative for itching and rash.   Neurological: Negative for dizziness.   All other systems reviewed and are negative.      Past Medical History    has a past medical history of Breath shortness, Cancer (1997), COPD (chronic obstructive pulmonary disease), Heart burn, Indigestion, Myocardial infarct (1996), Other specified disorder of intestines, and Stomach pain.    Surgical History   has a past surgical history that includes tumor excision with biopsy (1991); knee arthrotomy (1995); danielito by laparoscopy (2009); umbilical hernia repair (2009); gastroscopy with biopsy (11/16/2011); egd with asp/bx (11/16/2011); and ercp-diagnostic (11/16/2011).     Family History  family history includes Cancer in his sister; Heart Disease in his father; Stroke in his father.     Social History   reports that he has been smoking. He has a 75.00 pack-year smoking history. He does not have any smokeless tobacco history on file. He reports that he does not drink alcohol or use drugs.    Allergies  Allergies   Allergen Reactions   • Ciprofloxacin    • Simvastatin Hives   • Soma [Carisoprodol] Rash and Itching       Medications  Prior to Admission Medications   Prescriptions Last Dose Informant Patient Reported? Taking?   Naproxen Sodium (ALEVE PO)   Yes No   Sig: Take  by mouth as needed.   Pancrelipase, Lip-Prot-Amyl, 19028 UNIT CPEP   Yes No   Sig: Take  by mouth 3 times a day, with meals. Two capsules    hydrochlorothiazide (HYDRODIURIL) 25 MG TABS   Yes No   Sig: Take 25 mg by mouth every day. 1/2 tablet    hydrocodone-acetaminophen (VICODIN) 5-500 MG TABS   Yes No   Sig: Take 1-2 Tabs by mouth every four hours as needed.   lovastatin (MEVACOR) 40 MG tablet   Yes No   Sig: Take 40 mg by mouth every day. 1/2 tablet    omeprazole (PRILOSEC) 20 MG CPDR   Yes No   Sig: Take 20 mg by mouth 2 Times a Day. Two tablets    terazosin (HYTRIN) 1 MG CAPS   Yes No   Sig: Take 1 mg by mouth every evening.   vardenafil (LEVITRA) 20 MG tablet   Yes No   Sig: Take 20 mg by mouth as needed. 1/2 tablet      Facility-Administered Medications: None       Physical Exam  Temp:  [36.8 °C (98.3 °F)]  36.8 °C (98.3 °F)  BP: (140)/(75) 140/75    Physical Exam   Constitutional: He is oriented to person, place, and time. He appears well-developed and well-nourished.   HENT:   Head: Normocephalic and atraumatic.   Eyes: Conjunctivae and EOM are normal. Right eye exhibits no discharge. Left eye exhibits no discharge.   Cardiovascular: Normal rate, regular rhythm and intact distal pulses.   No murmur heard.  2+ Radial Pulses  Brisk Capillary Refill   Pulmonary/Chest: Effort normal and breath sounds normal. No respiratory distress. He has no wheezes.   Abdominal: Soft. Bowel sounds are normal. He exhibits no distension. There is no tenderness. There is no rebound.   Musculoskeletal: Normal range of motion. He exhibits no edema.   Neurological: He is alert and oriented to person, place, and time. No cranial nerve deficit.   Skin: Skin is warm and dry. He is not diaphoretic. No erythema.   Skin is warm and well perfused   Psychiatric: He has a normal mood and affect.       Laboratory:  Recent Labs     10/04/19  0610   WBC 10.6   RBC 3.20*   HEMOGLOBIN 8.0*   HEMATOCRIT 24.8*   MCV 77.5*   MCH 25.0*   MCHC 32.3*   RDW 51.1*   PLATELETCT 398   MPV 10.9     Recent Labs     10/04/19  0610   SODIUM 123*   POTASSIUM 4.6   CHLORIDE 90*   CO2 24   GLUCOSE 156*   BUN 18   CREATININE 0.80   CALCIUM 8.1*     Recent Labs     10/04/19  0610   GLUCOSE 156*         No results for input(s): NTPROBNP in the last 72 hours.      Recent Labs     10/04/19  0610   TROPONINT 26*       Urinalysis:    No results found     Imaging:  EC-ECHOCARDIOGRAM COMPLETE W/O CONT    (Results Pending)         Assessment/Plan:  I anticipate this patient will require at least two midnights for appropriate medical management, necessitating inpatient admission.    * Acute respiratory failure with hypoxia (HCC)- (present on admission)  Assessment & Plan  The patient presented to outside hospital with shortness of breath and hypoxia  He has improved  Treat  pneumonia, optimize his volume status  I expect he will need more diuresis, check echocardiogram and closely monitor I/O's to guide therapy    Hyponatremia- (present on admission)  Assessment & Plan  Differential diagnosis includes SIADH versus volume overload  Monitor I's and O's, I ordered repeat labs for morning  Obtain records for comparison    Pneumonia- (present on admission)  Assessment & Plan  Complains of cough, has a left shift, pro calcitonin elevated, possible infiltrate on chest xray  Unasyn    Anemia- (present on admission)  Assessment & Plan  Repeat hemoglobin here is 8, hold off on transfusion, repeat labs    Lung cancer (HCC)- (present on admission)  Assessment & Plan  Patient was recently diagnosed with lung cancer at the [facility]  Patient believe the diagnosis is non-small cell lung cancer but does not have further details  Records from the [facility] have been ordered    Congestive heart failure (CHF) ([facility])- (present on admission)  Assessment & Plan  Patient reports a history of congestive heart failure  His ejection fraction is not now  Check BNP  Check echocardiogram    COPD (chronic obstructive pulmonary disease) ([facility])- (present on admission)  Assessment & Plan  Chronic, no acute exacerbation  Inhaled bronchodilators as needed      VTE prophylaxis: hold off on chemoprophylaxis until sure that hemoglobin is stable

## 2019-10-04 NOTE — PROGRESS NOTES
Report given to T8 nurse. Patient transferred to unit with patient transport with cell phone and clothing. Patient's family at bedside and aware of transfer.

## 2019-10-04 NOTE — PROGRESS NOTES
2 RN Skin Check completed by CHAD Núñez  Devices in place- cardiac monitor, BP cuff, pulse ox, PIV x2, nasal cannula  Skin assessed under devices- all  Confirmed pressure found on- R heel, bilateral ears  New Potential breakdown found on- sacrum/buttocks; wound consult placed and wound reported  The following interventions in place- Q2 turning, repositioning of equipment, pillows for support/positioning, oxy ears

## 2019-10-05 PROBLEM — R79.89 ELEVATED TROPONIN: Status: ACTIVE | Noted: 2019-01-01

## 2019-10-05 NOTE — ASSESSMENT & PLAN NOTE
Likely aspiration pneumonia and has bilateral consolidation of the lower lobes  Patient is on Unasyn and doxycycline  Duo nebs ordered

## 2019-10-05 NOTE — PROGRESS NOTES
2 RN skin check completed with Devin BONILLA.    Wound noted on right foot,  Right Heel Red and blanching  Sacrum red and slow to omari,  BL ears red and blanching  Left AKA noted, stump intact    Waffle mattress in place, Protective ear foams in place on oxy mask, Pt encouraged to resposition every 2 hours.

## 2019-10-05 NOTE — CARE PLAN
Problem: Respiratory:  Goal: Respiratory status will improve  Outcome: PROGRESSING SLOWER THAN EXPECTED   Pt requiring oxymask/non-rebreather at 10-15L to sustain at >88% O2 sat.    Problem: Mobility  Goal: Risk for activity intolerance will decrease  Outcome: PROGRESSING SLOWER THAN EXPECTED   Pt has generalized weakness and requires use of 1-2 staff for movement/repositioning.

## 2019-10-05 NOTE — PROGRESS NOTES
[facility] Medicine Daily Progress Note    Date of Service  10/5/2019    Chief Complaint  73 y.o. male admitted 10/4/2019 with increasing shortness of breath      Interval Problem Update  Patient c/o sob this morning he is sitting in his bed, on o2 by o2 mask, denies chest pain or palpitation, no dizziness, family at bedside, no N/V/D/C, had cough no fever.   I ordered to start lasix 40 mg bid first dose stat patient responded well to lasix and sob improved, also getting ct chest, ABG, echo is pending.       Consultants/Specialty  none    Code Status  dnr/dni    Disposition  tbd    Review of Systems  Review of Systems   Constitutional: Positive for malaise/fatigue. Negative for chills and fever.   HENT: Negative for congestion and sinus pain.    Eyes: Negative for blurred vision.   Respiratory: Positive for shortness of breath and wheezing. Negative for cough and hemoptysis.    Cardiovascular: Negative for chest pain, palpitations and orthopnea.   Gastrointestinal: Negative for heartburn, nausea and vomiting.   Genitourinary: Negative for dysuria and urgency.   Musculoskeletal: Negative for back pain, myalgias and neck pain.   Skin: Negative for itching.   Neurological: Negative for dizziness, tingling, tremors and headaches.   Endo/Heme/Allergies: Negative for environmental allergies. Does not bruise/bleed easily.   Psychiatric/Behavioral: Negative for depression and suicidal ideas.        Physical Exam  Temp:  [36.2 °C (97.2 °F)-37.3 °C (99.1 °F)] 36.8 °C (98.3 °F)  Pulse:  [73-93] 93  Resp:  [14-24] 18  BP: (114-170)/(77-93) 114/93  SpO2:  [85 %-98 %] 94 %    Physical Exam   Constitutional: He is oriented to person, place, and time. He appears well-nourished. He appears distressed (respirator distress. ).   HENT:   Head: Normocephalic and atraumatic.   Mouth/Throat: No oropharyngeal exudate.   Eyes: Conjunctivae are normal. Right eye exhibits no discharge. Left eye exhibits no discharge. No scleral icterus.   Neck:  Normal range of motion. Neck supple. JVD present.   Cardiovascular: Normal heart sounds. Exam reveals no friction rub.   Pulmonary/Chest: Effort normal. No stridor. He has wheezes. He has rales.   Abdominal: Soft. Bowel sounds are normal. He exhibits no distension. There is no tenderness. There is no rebound.   Musculoskeletal: He exhibits edema (lower ext). He exhibits no tenderness.   Lymphadenopathy:     He has no cervical adenopathy.   Neurological: He is alert and oriented to person, place, and time. He exhibits normal muscle tone.   Skin: Skin is dry. No erythema.   Psychiatric: He has a normal mood and affect.   Nursing note and vitals reviewed.      Fluids    Intake/Output Summary (Last 24 hours) at 10/5/2019 1415  Last data filed at 10/5/2019 1330  Gross per 24 hour   Intake 270 ml   Output 2485 ml   Net -2215 ml       Laboratory  Recent Labs     10/04/19  0610 10/04/19  1849 10/05/19  0253   WBC 10.6 17.6* 15.0*   RBC 3.20* 3.78* 3.60*   HEMOGLOBIN 8.0* 9.3* 9.0*   HEMATOCRIT 24.8* 28.9* 28.1*   MCV 77.5* 76.5* 78.1*   MCH 25.0* 24.6* 25.0*   MCHC 32.3* 32.2* 32.0*   RDW 51.1* 49.9 50.7*   PLATELETCT 398 481* 456*   MPV 10.9 9.6 10.3     Recent Labs     10/04/19  0610 10/04/19  1849 10/05/19  0253   SODIUM 123* 124* 129*   POTASSIUM 4.6 4.0 4.0   CHLORIDE 90* 90* 91*   CO2 24 21 23   GLUCOSE 156* 153* 145*   BUN 18 16 16   CREATININE 0.80 0.65 0.71   CALCIUM 8.1* 8.5 8.4*                   Imaging  EC-ECHOCARDIOGRAM COMPLETE W/O CONT         DX-CHEST-LIMITED (1 VIEW)   Final Result         1.  Pulmonary edema and/or infiltrates.   2.  Opacity along the right mediastinum, could represent medial infiltrates, mediastinal mass is not excluded. Further evaluation with CT of chest with contrast for definitive characterization recommended.   3.  Cardiomegaly   4.  Atherosclerosis      These findings were discussed with the patient's clinician, Dr. Jordan, on 10/4/2019 8:54 PM.      CT-CHEST (THORAX) WITH     (Results Pending)        Assessment/Plan  * Acute respiratory failure with hypoxia (HCC)- (present on admission)  Assessment & Plan  Multifactorial fluid overload, pneumonia, lung CA.  Continue o2 per protocol  Close monitoring   Low threshold to transfer to ICU.        Hyponatremia- (present on admission)  Assessment & Plan  Differential diagnosis includes SIADH versus volume overload  Improved, continue monitoring.     Pneumonia- (present on admission)  Assessment & Plan  Severe pneumonia possible post obstructive vs aspiration, on unasyn will add doxy.   ct chest with contrast today.   Fu sputum cx and blood cx.     Anemia- (present on admission)  Assessment & Plan  Continue monitoring  Check iron levels      Lung cancer (HCC)- (present on admission)  Assessment & Plan  Patient was recently diagnosed with lung cancer at the [facility]  Patient believe the diagnosis is non-small cell lung cancer.  Apparently saw oncology and plan to start chemo soon   [facility]   Will repeat ct chest today to assess for complications/worsening condition.     Congestive heart failure (CHF) (HCC)- (present on admission)  Assessment & Plan  Patient reports a history of congestive heart failure  bnp is elevated   Echo pending.   Started him on bid lasix    COPD (chronic obstructive pulmonary disease) (HCC)- (present on admission)  Assessment & Plan  Chronic, no acute exacerbation  Will add breathing treatments.   Respiratory care per protocol            VTE prophylaxis: heparin

## 2019-10-05 NOTE — RESPIRATORY CARE
COPD EDUCATION by COPD CLINICAL EDUCATOR  10/5/2019 at 2:47 PM by Rosa Hilario     Patient reviewed by COPD education team. Patient does not qualify for the COPD program.

## 2019-10-05 NOTE — ASSESSMENT & PLAN NOTE
Likely demand ischemia due to acute respiratory failure, chf exacerbation. No chest pain. Trending down.

## 2019-10-05 NOTE — PROGRESS NOTES
CNA notified RN that pt O2 saturation at 79% on 10 L oxymask. Pt repositioned; lung sounds auscultated - coarse crackles noted in all fields; oxygen increased to 15 L oxymask; O2 saturation at 92%. MD Gavin notified of increased O2 demand; orders received to obtain stat chest x ray; administer 40 mg IV Lasix once; BNP with routine a.m. Labs. Pt placed on 15 L non rebreather; O2 sat at 92%; Lasix given.

## 2019-10-05 NOTE — ASSESSMENT & PLAN NOTE
Multifactorial fluid overload, aspiration pneumonia, lung CA.  ABG found severe hypoxia despite being on high flow oxygen  Patient has a mixed picture of metabolic alkalosis, metabolic acidosis and respiratory alkalosis  CTA of the lung was completed that found no PE, mediastinal mass and bilateral consolidation of the lower lobes  I have ordered BiPAP transfer to ICU.

## 2019-10-05 NOTE — PROGRESS NOTES
I was informed by radiology in regards to his x-ray that he has a opacity on the right lung that may be a mediastinal mass. A CT scan of the chest is recommended.

## 2019-10-05 NOTE — CARE PLAN
Problem: Bronchoconstriction:  Goal: Improve in air movement and diminished wheezing  Outcome: PROGRESSING AS EXPECTED   DUO QID

## 2019-10-05 NOTE — ASSESSMENT & PLAN NOTE
Patient was recently diagnosed with lung cancer at the [facility]  Patient believe the diagnosis is non-small cell lung cancer.  [facility] found that [facility] has metastatic brain lesions  Apparently saw oncology and plan to start chemo soon

## 2019-10-05 NOTE — ASSESSMENT & PLAN NOTE
Chest x-ray and CT scan found evidence of pulmonary edema  bnp is elevated   Echo found to be diastolic heart failure, with apical hypokinesis and EF of 55%  Given additional IV Lasix on top of his twice daily Lasix.  Ordered nitroglycerin patch

## 2019-10-05 NOTE — CARE PLAN
Problem: Communication  Goal: The ability to communicate needs accurately and effectively will improve  Outcome: PROGRESSING AS EXPECTED     Problem: Safety  Goal: Will remain free from injury  Outcome: PROGRESSING AS EXPECTED     Problem: Bowel/Gastric:  Goal: Normal bowel function is maintained or improved  Outcome: PROGRESSING AS EXPECTED     Problem: Knowledge Deficit  Goal: Knowledge of disease process/condition, treatment plan, diagnostic tests, and medications will improve  Outcome: PROGRESSING SLOWER THAN EXPECTED

## 2019-10-05 NOTE — PROGRESS NOTES
Received report from Frank. Pt resting in bed; no complaints at this time. Bed locked in lowest position; call light in reach; bed alarm in place.

## 2019-10-05 NOTE — CARE PLAN
Educated patient on the importance of good hand hygiene for self and staff, verbalized understanding.

## 2019-10-06 PROBLEM — I50.31 ACUTE DIASTOLIC CONGESTIVE HEART FAILURE (HCC): Status: ACTIVE | Noted: 2019-01-01

## 2019-10-06 PROBLEM — I48.0 PAROXYSMAL ATRIAL FIBRILLATION (HCC): Status: ACTIVE | Noted: 2019-01-01

## 2019-10-06 PROBLEM — I50.41 ACUTE COMBINED SYSTOLIC AND DIASTOLIC CONGESTIVE HEART FAILURE (HCC): Status: ACTIVE | Noted: 2019-01-01

## 2019-10-06 PROBLEM — J69.0 ASPIRATION PNEUMONIA (HCC): Status: ACTIVE | Noted: 2019-01-01

## 2019-10-06 PROBLEM — E83.42 HYPOMAGNESEMIA: Status: ACTIVE | Noted: 2019-01-01

## 2019-10-06 PROBLEM — E87.6 HYPOKALEMIA: Status: ACTIVE | Noted: 2019-01-01

## 2019-10-06 NOTE — PROGRESS NOTES
[facility] Medicine Daily Progress Note    Date of Service  10/6/2019    Chief Complaint  73 y.o. male admitted 10/4/2019 with history of diastolic heart failure, COPD, CAD comes in with complaints of shortness of breath.      Interval Problem Update  10/6: I was called overnight since patient was in respiratory distress.  He was tachypneic and his respiratory rate was 20s.  Patient saturation would drop as soon as he would fall asleep.  Patient was alert and oriented.  Chest x-ray interpreted by me found evidence of pulmonary edema.  He was given IV Lasix, nitroglycerin and placed on high flow oxygen.  Patient is being evaluated by SLP for aspiration pneumonia.  He was given additional DuoNeb's.  He was found to be severely hypoxic despite being on high flow oxygen.  He was sent for CT scan of the chest that found no evidence of PE, mediastinal mass and bilateral lower lobe consolidation.  I had a long discussion with the family since he has a history of lung cancer with metastatic brain lesions.  I expect the family that this is nutrition status and history of metastatic brain lesions that is swallowing is going to be hard to improve.  The family opted that he should be placed on BiPAP into the morning when further discussions can be taken place for comfort care.      Consultants/Specialty  none    Code Status  dnr/dni    Disposition  ICU    Review of Systems  Review of Systems   Constitutional: Positive for diaphoresis and malaise/fatigue. Negative for chills and fever.   HENT: Negative for congestion and sinus pain.    Eyes: Negative for blurred vision.   Respiratory: Positive for cough, shortness of breath and wheezing. Negative for hemoptysis.    Cardiovascular: Negative for chest pain, palpitations and orthopnea.   Gastrointestinal: Negative for heartburn, nausea and vomiting.   Genitourinary: Negative for dysuria and urgency.   Musculoskeletal: Negative for back pain, myalgias and neck pain.   Skin: Negative  for itching.   Neurological: Negative for dizziness, tingling, tremors and headaches.   Endo/Heme/Allergies: Negative for environmental allergies. Does not bruise/bleed easily.   Psychiatric/Behavioral: Negative for depression and suicidal ideas.        Physical Exam  Temp:  [36.1 °C (96.9 °F)-37.2 °C (99 °F)] 37.2 °C (99 °F)  Pulse:  [] 114  Resp:  [14-18] 14  BP: (114-165)/() 160/112  SpO2:  [84 %-98 %] 93 %    Physical Exam   Constitutional: He is oriented to person, place, and time. He appears distressed (respirator distress. ).   Malnourished  Fatigued   HENT:   Head: Normocephalic and atraumatic.   Mouth/Throat: No oropharyngeal exudate.   Eyes: Conjunctivae are normal. Right eye exhibits no discharge. Left eye exhibits no discharge. No scleral icterus.   Neck: Normal range of motion. Neck supple. JVD present.   Cardiovascular: Normal heart sounds. Exam reveals no friction rub.   Pulmonary/Chest: No stridor. He is in respiratory distress. He has wheezes. He has rales.   Abdominal: Soft. Bowel sounds are normal. He exhibits no distension. There is no tenderness. There is no rebound.   Musculoskeletal: He exhibits edema (lower ext). He exhibits no tenderness.   Lymphadenopathy:     He has no cervical adenopathy.   Neurological: He is alert and oriented to person, place, and time. He exhibits normal muscle tone.   Skin: He is diaphoretic. No erythema.   Psychiatric: He has a normal mood and affect.   Nursing note and vitals reviewed.      Fluids    Intake/Output Summary (Last 24 hours) at 10/6/2019 0454  Last data filed at 10/5/2019 2125  Gross per 24 hour   Intake 240 ml   Output 2760 ml   Net -2520 ml       Laboratory  Recent Labs     10/04/19  1849 10/05/19  0253 10/06/19  0216   WBC 17.6* 15.0* 15.9*   RBC 3.78* 3.60* 3.72*   HEMOGLOBIN 9.3* 9.0* 9.0*   HEMATOCRIT 28.9* 28.1* 28.2*   MCV 76.5* 78.1* 75.8*   MCH 24.6* 25.0* 24.2*   MCHC 32.2* 32.0* 31.9*   RDW 49.9 50.7* 49.2   PLATELETCT 481* 456*  510*   MPV 9.6 10.3 9.0     Recent Labs     10/04/19  1849 10/05/19  0253 10/06/19  0216   SODIUM 124* 129* 131*   POTASSIUM 4.0 4.0 3.0*   CHLORIDE 90* 91* 90*   CO2 21 23 23   GLUCOSE 153* 145* 143*   BUN 16 16 13   CREATININE 0.65 0.71 0.57   CALCIUM 8.5 8.4* 8.6                   Imaging  CT-CTA CHEST PULMONARY ARTERY W/ RECONS   Final Result      2.  There is no pulmonary arterial embolism.   3.  There is an approximately 7 x 9 mm sized right anterior and middle mediastinal mass with involvement of the right upper lung lobe. The superior vena cava is laterally displaced by the mass. Multiple mediastinal lymph nodes are seen. The differential    diagnosis includes lymphoma and lung carcinoma with involvement of mediastinum. Tissue biopsy is recommended.   4.  Atelectasis/consolidation noted involving bilateral lower lobes and right upper lobe.      DX-CHEST-PORTABLE (1 VIEW)   Final Result      1.  Right upper lobe/mediastinal opacity. CT scan is recommended for further evaluation.   2.  Prominent interstitial markings.   3.  There has been no significant interval change.      EC-ECHOCARDIOGRAM COMPLETE W/O CONT   Final Result      DX-CHEST-LIMITED (1 VIEW)   Final Result         1.  Pulmonary edema and/or infiltrates.   2.  Opacity along the right mediastinum, could represent medial infiltrates, mediastinal mass is not excluded. Further evaluation with CT of chest with contrast for definitive characterization recommended.   3.  Cardiomegaly   4.  Atherosclerosis      These findings were discussed with the patient's clinician, Dr. Jordan, on 10/4/2019 8:54 PM.           Assessment/Plan  * Acute respiratory failure with hypoxia (HCC)- (present on admission)  Assessment & Plan  Multifactorial fluid overload, aspiration pneumonia, lung CA.  ABG found severe hypoxia despite being on high flow oxygen  Patient has a mixed picture of metabolic alkalosis, metabolic acidosis and respiratory alkalosis  CTA of the lung was  completed that found no PE, mediastinal mass and bilateral consolidation of the lower lobes  I have ordered BiPAP transfer to ICU.        Hyponatremia- (present on admission)  Assessment & Plan  Differential diagnosis includes SIADH versus volume overload  Improved, continue monitoring.     Aspiration pneumonia (HCC)- (present on admission)  Assessment & Plan  Likely aspiration pneumonia and has bilateral consolidation of the lower lobes  Patient is on Unasyn and doxycycline  Duo nebs ordered    Anemia- (present on admission)  Assessment & Plan  Continue monitoring  Check iron levels      Lung cancer (HCC)- (present on admission)  Assessment & Plan  Patient was [facility]  Patient believe the diagnosis is non-small cell lung cancer.  [facility] found that [facility] has metastatic brain lesions  Apparently saw oncology and plan to start chemo soon     Acute combined systolic and diastolic congestive heart failure (HCC)- (present on admission)  Assessment & Plan  Chest x-ray and CT scan found evidence of pulmonary edema  bnp is elevated   Echo found to be diastolic heart failure, with apical hypokinesis and EF of 55%  Given additional IV Lasix on top of his twice daily Lasix.  Ordered nitroglycerin patch    Elevated troponin- (present on admission)  Assessment & Plan  Likely demand ischemia due to acute respiratory failure, chf exacerbation. No chest pain. Trending down.     COPD (chronic obstructive pulmonary disease) (HCC)- (present on admission)  Assessment & Plan  Chronic, no acute exacerbation  Will add breathing treatments.   Respiratory care per protocol          VTE prophylaxis: heparin    This gentleman will be admitted to the ICU.  He is critically ill on BiPAP for respiratory distress and acute hypoxemic respiratory failure.  I have assessed and reassessed his respiratory status, hemodynamics and cardiovascular status.  He is at increased risk for worsening  respiratory and cardiovascular system dysfunction.     High risk of deterioration and worsening vital organ dysfunction and death without the above critical care interventions.    Critical Care Time:  35 minutes  No time overlap  Time excludes procedures  Discussed with Consultants, RN, Clinical pharmacist

## 2019-10-06 NOTE — THERAPY
"  Speech Language Therapy Clinical Swallow Evaluation completed.  Functional Status: Patient was seen for a clinical bedside swallow evaluation this date. Patient was upright in bed with cup of water and ice in hand. Patient on 6L 02 via oxymask, attempted to switch to nasal cannula, patient kept pulling off mask. Patient non compliant, agitated, refusing medical care. Patient kept stating \"call 911\" and \"I won't touch your laced shit.\" Patient refusing PO trials and combative with RN. Patient consumed x1 ice chip and x1 sip of thin liquids via side of cup that he was holding. Patient refused anything further. Limited assessment secondary to agitation, compliance. Recommend NPO single ice chips with RN and re-assess in AM as patient is willing. SLP to follow.   Recommendations - Diet: Diet / Liquid Recommendation: NPO, Pre-Feeding Trials with SLP Only                          Strategies: Strict 1:1 feeding                           Medication Administration:    Plan of Care: Will benefit from Speech Therapy 3 times per week  Post-Acute Therapy: Discharge to a transitional care facility for continued skilled therapy services.    See \"Rehab Therapy-Acute\" Patient Summary Report for complete documentation.   "

## 2019-10-06 NOTE — PROGRESS NOTES
Pt confused , restless, agitated, pulling BIPAP mask off, insisting mask be removed. Pt placed on Oxy mask 6 L. Pt's sat's 97 %  on oxy mask. RT updated.

## 2019-10-06 NOTE — CARE PLAN
Problem: Venous Thromboembolism (VTW)/Deep Vein Thrombosis (DVT) Prevention:  Goal: Patient will participate in Venous Thrombosis (VTE)/Deep Vein Thrombosis (DVT)Prevention Measures  Outcome: PROGRESSING AS EXPECTED   Pt on sq heparin.    Problem: Knowledge Deficit  Goal: Knowledge of disease process/condition, treatment plan, diagnostic tests, and medications will improve  Outcome: PROGRESSING AS EXPECTED   Pt verbalizes understanding of oxygen delivery devices (specifically).    Problem: Respiratory:  Goal: Respiratory status will improve  Outcome: PROGRESSING SLOWER THAN EXPECTED   Pt requiring oxygen delivery devices (oxymask, nonrebreather, hi flow)    Problem: Fluid Volume:  Goal: Will maintain balanced intake and output  Outcome: PROGRESSING AS EXPECTED   Pt has condom catheter in place    Problem: Mobility  Goal: Risk for activity intolerance will decrease  Outcome: PROGRESSING SLOWER THAN EXPECTED   Pt tires quickly when repositioning while in bed.

## 2019-10-06 NOTE — PROGRESS NOTES
Bedside report received from NOC RN. Plan of care discussed. Lines labs med's and orders reviewed. Pt with BIPAP mask on and tolerating well at this time. Pt demonstrates ability to remove mask. Pt with call light in hand and educated to call for all needs. Bed locked in ,lowest position with alarm on.

## 2019-10-06 NOTE — PROGRESS NOTES
2 RN skin check complete with CHAD Olvera.   Devices in place oxymask, BP cuff, pulse ox, PIVs, wound dressing.  Skin assessed under devices: yes.  Confirmed pressure ulcers found on toe.  New potential pressure ulcers noted on sacrum, calf, and right heel. Wound consult placed. Sacrum excoriated, non blanching, and small opening to buttock. Calf is indented from SCD's on prior floor to ICU. Right heel non blanching. Pictures uploaded in epic.  The following interventions in place: Q2 hour turns, repositioning, extremities floating on pillows, tubes/lines off of skin, mepliex applied to sacrum.

## 2019-10-06 NOTE — ASSESSMENT & PLAN NOTE
Patient was rapid response for hypoxia went for stat CTA chest reviewed. Minimal infiltrates no pulmonary emboli extrinsic compression on trachea seen no significant edema net negative fluid balance.     Continue xoponex prn (tachycardia and afib)  ABG shows a respiratory alkalosis mild hypoxemia  Currently not tolerating Bipap to stent tracheal compression   Now on n/c tolerating  Still confused about goals of care will ask palliative care to help wife want to take him home we are now on n/c thought this might be an opportunity to take him home but now she doesn't want to ? Continue to explore goal    History of Small Cell Lung disease per family with metastatic disease to brain [facility]   [facility] ? Don't know why he is here?  [facility]    Continue to monitor goals and treatment options

## 2019-10-06 NOTE — PROGRESS NOTES
Pt's wife updated on Pt's confusion, refusing to wear oxygen, refusing care. Pt spoke with wife on phone, confuse, stating for wife to come pick him up now or he will start throwing things. Pt's wife calmed Pt on phone. Plan of Pt's wife to come to bedside around 1130- 1200 today.

## 2019-10-06 NOTE — ASSESSMENT & PLAN NOTE
Etiology of afib: agitated and sympathetic activity and tachypnea from tracheal compression  Rate controlled: no  Rhythm controlled: no    Maintain: K>4, mg>2    Start trail of metoprolol IV and give some fluids to see response

## 2019-10-06 NOTE — PROGRESS NOTES
Pt's wife and daughter at bedside. Updates given. Pt remains confused. Pt talking with family asking to leave and be taken outside.

## 2019-10-06 NOTE — PROGRESS NOTES
Received report from Leonard. Pt on 4L NC; still feeling sob despite O2 saturation in 90's. Pt to be NPO at 0000. Pt having complaints of pain around lateral chest wall area on inspiration (likely d/t use of accessory breathing muscles). POC discussed with patient and family. Bed locked in lowest position; call light in reach.

## 2019-10-06 NOTE — ASSESSMENT & PLAN NOTE
Hx of net negative currently   No b-line or findings to suggest pulmonary edema  Continue to monitor and treat blood pressure SBP > 160

## 2019-10-06 NOTE — ASSESSMENT & PLAN NOTE
Aspiration precaution  Upright position  Speech swallow   Discuss if cortrak in goals of care   Unasyn/doxy finish course

## 2019-10-06 NOTE — PROGRESS NOTES
Rapid response called at 2024 regarding significantly decreased O2 saturation and increased work of breathing. Pt placed on 9L face mask, up from 4L nasal cannula. Pt continued to maintain O2 saturation low to mid 80s, with significant work of breathing. Pt advanced to 15L non-rebreather, O2 saturation increased to 94%. Able to decrease to 8L on face mask, with 90-92% O2 saturation. Dr. Bharat Jordan notified. Per MARTHA Coello to give ONE dose of IV Lasix 40mg now, also give Duoneb nebulizer treatment now, change to high flow oxygen and recheck ABGs in 60 minutes. Additionally, concerns for possible aspiration were noted to Dr. Jordan. Per MARTHA Coello to maintain NPO for speech evaluation.

## 2019-10-06 NOTE — PROGRESS NOTES
Patient transferred to ICU this morning by Dr. Jordan  He is now on nasal cannula  Discussed with bedside RN, discussed with Dr. Robison of critical care  Continue antibiotics, continue diuresis  Will discuss hospice with family when they arrive  All other plans as per Dr. Jordan's progress note dated today

## 2019-10-06 NOTE — PROGRESS NOTES
Am Interdisciplinary rounds completed, updates given discussed that Pt refusing care, removing oxygen. Discussed Heart rate 140-158 intermittent AFIB and PVC's. -178/100's. DR Robison states that he spoke with Pt's wife this am and plan is for family to talk with MD this afternoon regarding plan of care and possible home hospice for Pt.

## 2019-10-06 NOTE — PROGRESS NOTES
0545 Patient transported to RICU R-101 with this ACLS RN, RRT RN, and CCT. VSS throughout transport. Family at bedside and walked to waiting room. Full bed bath and CHG bath given. RT at bedside, placed patient on bipap. Patient oriented x4 and demonstrates ability to remove bipap. 2 RN skin check complete. All pictures uploaded into Epic.

## 2019-10-06 NOTE — CONSULTS
Critical Care Consultation    Date of consult: 10/6/2019    Referring Physician  Jimbo Schofield M.D.    Reason for Consultation  Respiratory distress hypoxemia    History of Presenting Illness  73 y.o. male who presented 10/4/2019 with COPD, CHF w/ AR and TR ef 55% with RVSP 80 and normal RV function with metatastic lung cancer, COPD, hyponatremia, and aspiration pneumonia. That was transferred to floor for worsening desaturation overnight and respiratory distress transferred to ICU for BiPAP. Patient had a CTA that showed no pulmonary emboli this early am with large compressive middle mediastinal mass with involvement of the RUL and displace the SVC also was seen with bilateral lower and right upper lobe infiltrates. He was given lasix and also nitroglycerin. Wife and daughter are at bedside and they hope to take him him on hospice. Currently on bipap which the patient wants me to take it off.    Day rounding:  Neuro: confused and pulling at oxygen pulling bipap   HR: 120-150's  SBP: 160-170  Tmax: 99.7  GI: sips w/ med aspiration precaution  UOP: condom cath lasix 1400ml  Lines: 2 peripheral IV   Resp: 6l n/c refusing pulling off bipap    Vte: heparin sq   PPI/H2: ppi   Antibx: unasyn and doxy  Lasix 40mg BID  Mag 2gram   Palliative care consult  LR x 500ml  VBG  Metoprolol 5mg IV  Long family discussion    Bedside Echo with good bi-V function no effusion or b lines  IVC small some respiratory change    Code Status  DNAR/DNI    Review of Systems  Review of Systems   Unable to perform ROS: Critical illness       Past Medical History   has a past medical history of Breath shortness, Cancer (1997), COPD (chronic obstructive pulmonary disease), Heart burn, Indigestion, Myocardial infarct (1996), Other specified disorder of intestines, and Stomach pain.    Surgical History   has a past surgical history that includes tumor excision with biopsy (1991); knee arthrotomy (1995); danielito by laparoscopy (2009); umbilical hernia  repair (2009); gastroscopy with biopsy (11/16/2011); egd with asp/bx (11/16/2011); and ercp-diagnostic (11/16/2011).    Family History  family history includes Cancer in his sister; Heart Disease in his father; Stroke in his father.    Social History   reports that he has been smoking. He has a 75.00 pack-year smoking history. He does not have any smokeless tobacco history on file. He reports that he does not drink alcohol or use drugs.    Medications  Home Medications    **Home medications have not yet been reviewed for this encounter**       Current Facility-Administered Medications   Medication Dose Route Frequency Provider Last Rate Last Dose   • doxycycline (VIBRAMYCIN) 100 mg in  mL IVPB  100 mg Intravenous Q12HRS Bharat Jordan M.D.   Stopped at 10/06/19 0449   • pantoprazole (PROTONIX) injection 40 mg  40 mg Intravenous DAILY Bharat Jordan M.D.   40 mg at 10/06/19 0824   • lactated ringer BOLUS infusion  500 mL Intravenous Once Jermain Robison M.D.       • Metoprolol Tartrate (LOPRESSOR) injection 5 mg  5 mg Intravenous Once Jermain Robison M.D.       • furosemide (LASIX) injection 40 mg  40 mg Intravenous BID DIURETIC Trey Soto M.D.   40 mg at 10/06/19 0652   • HYDROcodone-acetaminophen (NORCO) 5-325 MG per tablet 1 Tab  1 Tab Oral Q6HRS PRN Trey Soto M.D.   1 Tab at 10/05/19 1934   • heparin injection 5,000 Units  5,000 Units Subcutaneous Q8HRS Trey Soto M.D.   5,000 Units at 10/06/19 1438   • ipratropium-albuterol (DUONEB) nebulizer solution  3 mL Nebulization 4X/DAY (RT) Trey Soto M.D.   3 mL at 10/06/19 0700   • senna-docusate (PERICOLACE or SENOKOT S) 8.6-50 MG per tablet 2 Tab  2 Tab Oral BID Jimbo Schofield M.D.   Stopped at 10/05/19 0600    And   • polyethylene glycol/lytes (MIRALAX) PACKET 1 Packet  1 Packet Oral QDAY PRN Jimbo Schofield M.D.        And   • magnesium hydroxide (MILK OF MAGNESIA) suspension 30 mL  30 mL Oral QDAY PRN Jimbo Schofield,  M.D.        And   • bisacodyl (DULCOLAX) suppository 10 mg  10 mg Rectal QDAY PRN Jimbo Schofield M.D.       • Respiratory Care per Protocol   Nebulization Continuous RT Jimbo Schofield M.D.       • acetaminophen (TYLENOL) tablet 650 mg  650 mg Oral Q6HRS PRN Jimbo Schofield M.D.   650 mg at 10/05/19 0850   • ondansetron (ZOFRAN) syringe/vial injection 4 mg  4 mg Intravenous Q4HRS PRN Jimbo Schofield M.D.       • ondansetron (ZOFRAN ODT) dispertab 4 mg  4 mg Oral Q4HRS PRN Jimbo Schofield M.D.       • Respiratory Care per Protocol   Nebulization Continuous RT Jimbo Schofield M.D.       • ampicillin/sulbactam (UNASYN) 3 g in  mL IVPB  3 g Intravenous Q6HRS Jimbo Schofield M.D.   Stopped at 10/06/19 1338       Allergies  Allergies   Allergen Reactions   • Ciprofloxacin    • Simvastatin Hives   • Soma [Carisoprodol] Rash and Itching       Vital Signs last 24 hours  Temp:  [36.1 °C (96.9 °F)-37.2 °C (99 °F)] 36.6 °C (97.8 °F)  Pulse:  [] 150  Resp:  [14-40] 38  BP: (130-172)/() 158/102  SpO2:  [84 %-98 %] 93 %    Physical Exam  Physical Exam   Constitutional: He appears well-nourished. He appears distressed.   Chronically ill appearing, sitting with his wife at the bedside on bipap   HENT:   Head: Normocephalic and atraumatic.   Dry mucous membranes, poor dentention   Eyes: Pupils are equal, round, and reactive to light. EOM are normal.   Neck: No JVD present. No tracheal deviation present. No thyromegaly present.   Cardiovascular:   Irregular irregular rapid respiratory rate   Pulmonary/Chest: He is in respiratory distress. He has no wheezes. He has no rales. He exhibits no tenderness.   Decrease breath sounds to right chest   Abdominal: He exhibits no distension. There is no tenderness. There is no rebound and no guarding.   Musculoskeletal: He exhibits no edema.   Left leg amputation above the knee   Neurological: He is alert. No cranial nerve deficit. Coordination normal.   Moves all extremities hard of hearing,  confused delirium   Skin: Skin is warm and dry. He is not diaphoretic.       Fluids    Intake/Output Summary (Last 24 hours) at 10/6/2019 1459  Last data filed at 10/6/2019 1319  Gross per 24 hour   Intake 210 ml   Output 2650 ml   Net -2440 ml       Laboratory  Recent Results (from the past 48 hour(s))   TROPONIN    Collection Time: 10/04/19  6:49 PM   Result Value Ref Range    Troponin T 23 (H) 6 - 19 ng/L   CBC WITH DIFFERENTIAL    Collection Time: 10/04/19  6:49 PM   Result Value Ref Range    WBC 17.6 (H) 4.8 - 10.8 K/uL    RBC 3.78 (L) 4.70 - 6.10 M/uL    Hemoglobin 9.3 (L) 14.0 - 18.0 g/dL    Hematocrit 28.9 (L) 42.0 - 52.0 %    MCV 76.5 (L) 81.4 - 97.8 fL    MCH 24.6 (L) 27.0 - 33.0 pg    MCHC 32.2 (L) 33.7 - 35.3 g/dL    RDW 49.9 35.9 - 50.0 fL    Platelet Count 481 (H) 164 - 446 K/uL    MPV 9.6 9.0 - 12.9 fL    Neutrophils-Polys 92.80 (H) 44.00 - 72.00 %    Lymphocytes 3.50 (L) 22.00 - 41.00 %    Monocytes 2.50 0.00 - 13.40 %    Eosinophils 0.00 0.00 - 6.90 %    Basophils 0.30 0.00 - 1.80 %    Immature Granulocytes 0.90 0.00 - 0.90 %    Nucleated RBC 0.00 /100 WBC    Neutrophils (Absolute) 16.29 (H) 1.82 - 7.42 K/uL    Lymphs (Absolute) 0.62 (L) 1.00 - 4.80 K/uL    Monos (Absolute) 0.44 0.00 - 0.85 K/uL    Eos (Absolute) 0.00 0.00 - 0.51 K/uL    Baso (Absolute) 0.06 0.00 - 0.12 K/uL    Immature Granulocytes (abs) 0.15 (H) 0.00 - 0.11 K/uL    NRBC (Absolute) 0.00 K/uL   Basic Metabolic Panel    Collection Time: 10/04/19  6:49 PM   Result Value Ref Range    Sodium 124 (L) 135 - 145 mmol/L    Potassium 4.0 3.6 - 5.5 mmol/L    Chloride 90 (L) 96 - 112 mmol/L    Co2 21 20 - 33 mmol/L    Glucose 153 (H) 65 - 99 mg/dL    Bun 16 8 - 22 mg/dL    Creatinine 0.65 0.50 - 1.40 mg/dL    Calcium 8.5 8.5 - 10.5 mg/dL    Anion Gap 13.0 (H) 0.0 - 11.9   ESTIMATED GFR    Collection Time: 10/04/19  6:49 PM   Result Value Ref Range    GFR If African American >60 >60 mL/min/1.73 m 2    GFR If Non  >60 >60  mL/min/1.73 m 2   EKG    Collection Time: 10/04/19  7:36 PM   Result Value Ref Range    Report       [facility]    Test Date:  2019-10-04  Pt Name:    ELEN MCLAIN              Department: [id number]  MRN:        [id number]                      Room:       [facility]  Gender:     Male                         Technician: NABEEL  :        1945                   Requested By:RAMY LIU  Order #:    [id number]                    Reading MD: Noris Fernandes MD    Measurements  Intervals                                Axis  Rate:       91                           P:          78  LA:         176                          QRS:        -17  QRSD:       100                          T:          45  QT:         392  QTc:        483    Interpretive Statements  SINUS RHYTHM  ATRIAL PREMATURE COMPLEX  LEFT AXIS DEVIATION  LOW VOLTAGE IN FRONTAL LEADS  Compared to ECG 10/04/2019 08:57:03  NO SIGNIFICANT CHANGE  Electronically Signed On 10-5-2019 6:45:55 PDT by Noris Fernandes MD     CBC with Differential    Collection Time: 10/05/19  2:53 AM   Result Value Ref Range    WBC 15.0 (H) 4.8 - 10.8 K/uL    RBC 3.60 (L) 4.70 - 6.10 M/uL    Hemoglobin 9.0 (L) 14.0 - 18.0 g/dL    Hematocrit 28.1 (L) 42.0 - 52.0 %    MCV 78.1 (L) 81.4 - 97.8 fL    MCH 25.0 (L) 27.0 - 33.0 pg    MCHC 32.0 (L) 33.7 - 35.3 g/dL    RDW 50.7 (H) 35.9 - 50.0 fL    Platelet Count 456 (H) 164 - 446 K/uL    MPV 10.3 9.0 - 12.9 fL    Neutrophils-Polys 90.40 (H) 44.00 - 72.00 %    Lymphocytes 3.10 (L) 22.00 - 41.00 %    Monocytes 5.30 0.00 - 13.40 %    Eosinophils 0.10 0.00 - 6.90 %    Basophils 0.10 0.00 - 1.80 %    Immature Granulocytes 1.00 (H) 0.00 - 0.90 %    Nucleated RBC 0.00 /100 WBC    Neutrophils (Absolute) 13.51 (H) 1.82 - 7.42 K/uL    Lymphs (Absolute) 0.47 (L) 1.00 - 4.80 K/uL    Monos (Absolute) 0.80 0.00 - 0.85 K/uL    Eos (Absolute) 0.01 0.00 - 0.51 K/uL    Baso (Absolute) 0.02 0.00 - 0.12 K/uL    Immature Granulocytes (abs) 0.15 (H) 0.00 - 0.11  K/uL    NRBC (Absolute) 0.00 K/uL   Comp Metabolic Panel    Collection Time: 10/05/19  2:53 AM   Result Value Ref Range    Sodium 129 (L) 135 - 145 mmol/L    Potassium 4.0 3.6 - 5.5 mmol/L    Chloride 91 (L) 96 - 112 mmol/L    Co2 23 20 - 33 mmol/L    Anion Gap 15.0 (H) 0.0 - 11.9    Glucose 145 (H) 65 - 99 mg/dL    Bun 16 8 - 22 mg/dL    Creatinine 0.71 0.50 - 1.40 mg/dL    Calcium 8.4 (L) 8.5 - 10.5 mg/dL    AST(SGOT) 10 (L) 12 - 45 U/L    ALT(SGPT) 12 2 - 50 U/L    Alkaline Phosphatase 126 (H) 30 - 99 U/L    Total Bilirubin 0.4 0.1 - 1.5 mg/dL    Albumin 3.3 3.2 - 4.9 g/dL    Total Protein 6.5 6.0 - 8.2 g/dL    Globulin 3.2 1.9 - 3.5 g/dL    A-G Ratio 1.0 g/dL   MAGNESIUM    Collection Time: 10/05/19  2:53 AM   Result Value Ref Range    Magnesium 1.5 1.5 - 2.5 mg/dL   PHOSPHORUS    Collection Time: 10/05/19  2:53 AM   Result Value Ref Range    Phosphorus 3.9 2.5 - 4.5 mg/dL   proBrain Natriuretic Peptide, NT    Collection Time: 10/05/19  2:53 AM   Result Value Ref Range    NT-proBNP 04776 (H) 0 - 125 pg/mL   ESTIMATED GFR    Collection Time: 10/05/19  2:53 AM   Result Value Ref Range    GFR If African American >60 >60 mL/min/1.73 m 2    GFR If Non African American >60 >60 mL/min/1.73 m 2   ABG - LAB    Collection Time: 10/05/19 10:43 AM   Result Value Ref Range    Ph 7.55 (H) 7.40 - 7.50    Pco2 24.1 (L) 26.0 - 37.0 mmHg    Po2 53.3 (L) 64.0 - 87.0 mmHg    O2 Saturation 87.9 (L) 93.0 - 99.0 %    Hco3 21 17 - 25 mmol/L    Base Excess -1 -4 - 3 mmol/L    Body Temp 37.0 Centigrade    O2 Therapy 6.0 2.0 - 10.0 L/min    Ph -TC 7.55 (H) 7.40 - 7.50    Pco2 -TC 24.1 (L) 26.0 - 37.0 mmHg    Po2 -TC 53.3 (L) 64.0 - 87.0 mmHg   EC-ECHOCARDIOGRAM COMPLETE W/O CONT    Collection Time: 10/05/19  2:01 PM   Result Value Ref Range    Eject.Frac. MOD BP 55.39     Eject.Frac. MOD 4C 53.42     Eject.Frac. MOD 2C 61.07     Left Ventrical Ejection Fraction 55    EKG    Collection Time: 10/05/19  8:32 PM   Result Value Ref Range     Report       [facility]    Test Date:  2019-10-05  Pt Name:    ELEN MCLAIN              Department: 183  MRN:        [id number]                      Room:       T801  Gender:     Male                         Technician: NABEEL  :        1945                   Requested By:SHANTE MULLEN  Order #:    [id number]                    Reading MD: Jaylon Osullivan MD    Measurements  Intervals                                Axis  Rate:       106                          P:          73  MD:         156                          QRS:        12  QRSD:       100                          T:          60  QT:         376  QTc:        500    Interpretive Statements  SINUS TACHYCARDIA  PAIRED VENTRICULAR PREMATURE COMPLEXES  PROBABLE LEFT ATRIAL ABNORMALITY  BORDERLINE LOW VOLTAGE IN FRONTAL LEADS  BORDERLINE R WAVE PROGRESSION, ANTERIOR LEADS  Compared to ECG 10/04/2019 19:36:29  NO SIGNIFICANT CHANGES    Electronically Signed On 10-6-2019 1:04:56 PDT by Jaylon Osullivan MD     ARTERIAL BLOOD GAS    Collection Time: 10/05/19  8:47 PM   Result Value Ref Range    Ph 7.56 (H) 7.40 - 7.50    Pco2 22.6 (L) 26.0 - 37.0 mmHg    Po2 56.1 (L) 64.0 - 87.0 mmHg    O2 Saturation 90.6 (L) 93.0 - 99.0 %    Hco3 20 17 - 25 mmol/L    Base Excess -1 -4 - 3 mmol/L    Body Temp see below Centigrade   ABG - LAB    Collection Time: 10/05/19 10:55 PM   Result Value Ref Range    Ph 7.57 (H) 7.40 - 7.50    Pco2 25.5 (L) 26.0 - 37.0 mmHg    Po2 54.1 (L) 64.0 - 87.0 mmHg    O2 Saturation 88.9 (L) 93.0 - 99.0 %    Hco3 23 17 - 25 mmol/L    Base Excess 2 -4 - 3 mmol/L    Body Temp see below Centigrade   CBC WITH DIFFERENTIAL    Collection Time: 10/06/19  2:16 AM   Result Value Ref Range    WBC 15.9 (H) 4.8 - 10.8 K/uL    RBC 3.72 (L) 4.70 - 6.10 M/uL    Hemoglobin 9.0 (L) 14.0 - 18.0 g/dL    Hematocrit 28.2 (L) 42.0 - 52.0 %    MCV 75.8 (L) 81.4 - 97.8 fL    MCH 24.2 (L) 27.0 - 33.0 pg    MCHC 31.9 (L) 33.7 - 35.3 g/dL    RDW 49.2  35.9 - 50.0 fL    Platelet Count 510 (H) 164 - 446 K/uL    MPV 9.0 9.0 - 12.9 fL    Neutrophils-Polys 89.80 (H) 44.00 - 72.00 %    Lymphocytes 3.80 (L) 22.00 - 41.00 %    Monocytes 5.10 0.00 - 13.40 %    Eosinophils 0.10 0.00 - 6.90 %    Basophils 0.30 0.00 - 1.80 %    Immature Granulocytes 0.90 0.00 - 0.90 %    Nucleated RBC 0.00 /100 WBC    Neutrophils (Absolute) 14.26 (H) 1.82 - 7.42 K/uL    Lymphs (Absolute) 0.60 (L) 1.00 - 4.80 K/uL    Monos (Absolute) 0.81 0.00 - 0.85 K/uL    Eos (Absolute) 0.01 0.00 - 0.51 K/uL    Baso (Absolute) 0.04 0.00 - 0.12 K/uL    Immature Granulocytes (abs) 0.15 (H) 0.00 - 0.11 K/uL    NRBC (Absolute) 0.00 K/uL   Basic Metabolic Panel    Collection Time: 10/06/19  2:16 AM   Result Value Ref Range    Sodium 131 (L) 135 - 145 mmol/L    Potassium 3.0 (L) 3.6 - 5.5 mmol/L    Chloride 90 (L) 96 - 112 mmol/L    Co2 23 20 - 33 mmol/L    Glucose 143 (H) 65 - 99 mg/dL    Bun 13 8 - 22 mg/dL    Creatinine 0.57 0.50 - 1.40 mg/dL    Calcium 8.6 8.5 - 10.5 mg/dL    Anion Gap 18.0 (H) 0.0 - 11.9   MAGNESIUM    Collection Time: 10/06/19  2:16 AM   Result Value Ref Range    Magnesium 1.7 1.5 - 2.5 mg/dL   PHOSPHORUS    Collection Time: 10/06/19  2:16 AM   Result Value Ref Range    Phosphorus 2.9 2.5 - 4.5 mg/dL   ABG - LAB    Collection Time: 10/06/19  2:16 AM   Result Value Ref Range    Ph 7.58 (H) 7.40 - 7.50    Pco2 28.1 26.0 - 37.0 mmHg    Po2 58.4 (L) 64.0 - 87.0 mmHg    O2 Saturation 90.8 (L) 93.0 - 99.0 %    Hco3 26 (H) 17 - 25 mmol/L    Base Excess 4 (H) -4 - 3 mmol/L    Body Temp see below Centigrade   ESTIMATED GFR    Collection Time: 10/06/19  2:16 AM   Result Value Ref Range    GFR If African American >60 >60 mL/min/1.73 m 2    GFR If Non African American >60 >60 mL/min/1.73 m 2       Imaging  DX-CHEST-PORTABLE (1 VIEW)   Final Result      1.  Enlarged cardiac silhouette with changes of interstitial edema.   2.  No change in the right paramediastinal opacity which could be due to vascular  prominence or mass.      CT-CTA CHEST PULMONARY ARTERY W/ RECONS   Final Result      2.  There is no pulmonary arterial embolism.   3.  There is an approximately 7 x 9 mm sized right anterior and middle mediastinal mass with involvement of the right upper lung lobe. The superior vena cava is laterally displaced by the mass. Multiple mediastinal lymph nodes are seen. The differential    diagnosis includes lymphoma and lung carcinoma with involvement of mediastinum. Tissue biopsy is recommended.   4.  Atelectasis/consolidation noted involving bilateral lower lobes and right upper lobe.      DX-CHEST-PORTABLE (1 VIEW)   Final Result      1.  Right upper lobe/mediastinal opacity. CT scan is recommended for further evaluation.   2.  Prominent interstitial markings.   3.  There has been no significant interval change.      EC-ECHOCARDIOGRAM COMPLETE W/O CONT   Final Result      DX-CHEST-LIMITED (1 VIEW)   Final Result         1.  Pulmonary edema and/or infiltrates.   2.  Opacity along the right mediastinum, could represent medial infiltrates, mediastinal mass is not excluded. Further evaluation with CT of chest with contrast for definitive characterization recommended.   3.  Cardiomegaly   4.  Atherosclerosis      These findings were discussed with the patient's clinician, Dr. Jordan, on 10/4/2019 8:54 PM.          Assessment/Plan  * Acute respiratory failure with hypoxia (HCC)- (present on admission)  Assessment & Plan  Patient was rapid response for hypoxia went for stat CTA chest reviewed. Minimal infiltrates no pulmonary emboli extrinsic compression on trachea seen no significant edema net negative fluid balance.     Continue xoponex prn (tachycardia and afib)  ABG shows a respiratory alkalosis mild hypoxemia  Currently not tolerating Bipap to stent tracheal compression   Now on n/c tolerating  Still confused about goals of care will ask palliative care to help wife want to take him home we are now on n/c thought this  might be an opportunity to take him home but now she doesn't want to ? Continue to explore goal    History of Small Cell Lung disease per family with metastatic disease to brain [facility]   [facility] ? Don't know why he is here?  [facility]    Continue to monitor goals and treatment options         Hyponatremia- (present on admission)  Assessment & Plan  Mild improved continue to monitor  SIADH vs other   Avoid hypotonic fluids    Aspiration pneumonia (HCC)- (present on admission)  Assessment & Plan  Aspiration precaution  Upright position  Speech swallow   Discuss if cortrak in goals of care   Unasyn/doxy finish course    Anemia- (present on admission)  Assessment & Plan  Restrictive transfusion strategy hg < 7    Lung cancer (HCC)- (present on admission)  Assessment & Plan  Small Cell Lung CA metastatic to brain per wife  [facility] incomplete data    Acute combined systolic and diastolic congestive heart failure (HCC)- (present on admission)  Assessment & Plan  Hx of net negative currently   No b-line or findings to suggest pulmonary edema  Continue to monitor and treat blood pressure SBP > 160         Paroxysmal atrial fibrillation (HCC)  Assessment & Plan  Etiology of afib: agitated and sympathetic activity and tachypnea from tracheal compression  Rate controlled: no  Rhythm controlled: no    Maintain: K>4, mg>2    Start trail of metoprolol IV and give some fluids to see response         Hypokalemia  Assessment & Plan  Monitor and replace    Hypomagnesemia  Assessment & Plan  Replace and monitor    COPD (chronic obstructive pulmonary disease) (HCC)- (present on admission)  Assessment & Plan  Not wheezing on exam   Use Xopenex prn   Monitor need for steroids  Sat > 88%      Discussed patient condition and risk of morbidity and/or mortality with Hospitalist, Family, RN, RT, Pharmacy, Charge nurse / hot rounds, Patient and palliative care.      The  patient remains critically ill from atrial fibrillation giving IV metoprolol and work of breathing and monitoring for need for going back on bipap and monitoring closely coming off of it.  Critical care time = 89 minutes in directly providing and coordinating critical care and extensive data review.  No time overlap and excludes procedures.

## 2019-10-07 NOTE — PROGRESS NOTES
Critical CARE progress note  This is a 73-year-old male that presented with hypoxic respiratory failure.  He has a past medical history of COPD, CHF with aortic regurg and tricuspid regurg and an EF of 55% and RVSP of 80 mmHg.  He has a history of metastatic lung cancer.  He has had aspiration pneumonia.  He was transferred to the ICU this morning for BiPAP.  He had previously been transferred to the ICU from an outlying facility for respiratory failure.  He does have a compressive medial middle mediastinal mass with involvement of the right upper lobe with a displaced SVC.  He also had bilateral lower and right upper lobe infiltrates.  He is currently on oxygen 6 L nasal cannula.  He has a high work of breathing with respiratory rate 30-50.  He is alert and oriented.  He has A. fib RVR to 130-150s.  He does not currently have any chest pain.  He denies shortness of breath.  He also does not want his oxygen monitor on, prior to removing his oxygen that was at 6 L nasal cannula his oxygen saturation was 84%.  He is very pale and has a bluish appearance.  He is very adamant that he is done with therapy however he does not want to make himself comfort care despite understanding the difference being comfort care and DNR and DNI.  He did asked me to talk to his wife.  He also wanted to talk to his wife himself.  I talked to his wife over the phone of which she expressed understanding and she attempted to talk to him over the phone however he had difficulty carrying on a conversation.  However he did express to her his wishes and she understood.  I rediscussed with the patient's wife over the phone.  Since the prior discussion he did have several beats of V. tach.  Over this time he has become more altered.  At this point we cannot take his decision.  She said that his daughter is coming into the hospital to talk to him.  She is okay for now to continue DNR and DNI and she is not ready to make him comfort care until his  daughter talks to him.  She is unable to come to the hospital at this time.  She wants to respect his wishes.  I did tell her that I will put in the chart that it is a okay per the patient's wishes and the family's wishes to allow the patient be off of oxygen at this point she does not want the patient restrained for oxygen use as she thinks that he will get severely agitated and she does not want him sedated.  she understands that he may pass at any time due to the severe hypoxia regarding the atrial fibrillation I gave him 20 mg IV diltiazem and 10 mg of IV diltiazem but only had a short-term effect.  I have started him on a diltiazem drip for heart rate goal of less than 110.  This is likely driven a lot by the hypoxia.    Reviewed chart, imaging, notes and orders    Assessment and Plan  Hypoxic respiratory failure  Metastatic lung cancer  Atrial fibrillation with RVR  DNR/DNI  COPD  CHF, diastolic  Pulmonary hypertension    Patient is critically ill. The patient continues to have severe hypoxia to 80-85% prior to removing oxygen and a respiratory rate 40-50s.  Atrial fibrillation RVR to 150s.  There is a very high risk of death without critical care management and monitoring.  If untreated there is a high chance of deterioration and eventually death. The critical that has been undertaken is medically complex. There has been no overlap in critical care time. Critical Care Time not including procedures: 32 minutes

## 2019-10-07 NOTE — PROGRESS NOTES
"Patient refusing all oxygen at this time. Patient is pushing away RN and RT and states he \"would rather die than wear oxygen.\" Patient is A&O4 and requests this RN to call MD and wife to let him know his wishes. Dr. Montalvo updated, on his way to bedside.  "

## 2019-10-07 NOTE — PROGRESS NOTES
Updated MD Katia on patient sustaining a fib with rate of 140s. MD at bedside. New orders received.

## 2019-10-07 NOTE — CARE PLAN
Problem: Skin Integrity  Goal: Risk for impaired skin integrity will decrease  Intervention: Implement precautions to protect skin integrity in collaboration with the interdisciplinary team  Note:   Patient turned/repositioned Q2 hours, extremities floating on pillows, tubes/lines off of skin, pulse ox rotated, mepilex applied to sacrum.     Problem: Pain Management  Goal: Pain level will decrease to patient's comfort goal  Intervention: Follow pain managment plan developed in collaboration with patient and Interdisciplinary Team  Note:   Patient given PRN morphine during transition to comfort care. Morphine given using CPOT pain score. Will continue to administer pain meds as appropriate.

## 2019-10-07 NOTE — PROGRESS NOTES
Patient  at 1050. Wife Berkley called and notified. Daughter who was at bedside stepped out to get something to eat, was notified as soon as she entered the room around 1055.

## 2019-10-07 NOTE — HEART FAILURE PROGRAM
"Cardiovascular Nurse Navigator () Advanced Heart Failure Program Inpatient Progress Note:    Please note palliative/hospice consult pending. Patient is currently comfort care code status.    He apparently has a history of \"diastolic HF\" has not seen a cardiologist in our system. He has been diagnosed with HF by hospital medicine.    It should be noted that patient also has lung cancer and likely aspiration pneumonia.    If patient discharges with Hospice, a seven day HF f/u appt is not indicated.     Should patient discharge without hospice, he will require a seven calendar day f/u appt which can be acquired by placing a \"schedule heart failure follow up appointment\" order per protocol or by calling the hospital schedulers at [phone].      Thank you and please call with questions or concerns.    "

## 2019-10-07 NOTE — PROGRESS NOTES
"2225 MD at bedside. MD spoke with patient and called wife. Patient spoke with wife on the phone. Continues to refuse oxygen, states \"it's his final wish.\" This RN spoke with MD Montalvo. MD will call wife back with plan of care.  "

## 2019-10-08 NOTE — DISCHARGE SUMMARY
Death Summary    Cause of Death  Metastatic lung cancer    Comorbid Conditions at the Time of Death  Principal Problem:    Acute respiratory failure with hypoxia (HCC) POA: Yes  Active Problems:    Aspiration pneumonia (HCC) POA: Yes    Hyponatremia POA: Yes    Acute combined systolic and diastolic congestive heart failure (HCC) POA: Yes    Lung cancer (HCC) POA: Yes    Anemia POA: Yes    COPD (chronic obstructive pulmonary disease) (HCC) POA: Yes    Elevated troponin POA: Yes    Hypomagnesemia POA: Unknown    Hypokalemia POA: Unknown    Paroxysmal atrial fibrillation (HCC) POA: Unknown  Resolved Problems:    * No resolved hospital problems. *      History of Presenting Illness and Hospital Course  This is a 73 y.o. male admitted 10/4/2019 with shortness of breath.    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  Work-up was done at the [facility].  The patient presented to the emergency room at an outside hospital with shortness of breath.  He was found to be hypoxic and was transferred to [facility] for higher level of care and specialty coverage.  The patient was treated with diuresis and IV antibiotics.  He initially showed some improvement but then worsened and had to be transferred to the ICU for noninvasive ventilation.  On 10/6/2018 I met with the patient, his wife, and daughter.  Patient's wife described recent functional decline.  The patient was requiring essentially 24-hour care from his wife with assistance feeding himself, dressing, and showering.  During this meeting we discussed home hospice and plans were made to select a hospice agency and meet with them to see if the patient could be cared for at home.  I during the evening on 10/6/2019 he continued to decline, the patient became confused and agitated, pulling off his oxygen.  The care team contacted the family and they did not feel that a restraining the patient for therapy was  appropriate.  He was transitioned to comfort care.  The patient  on 10/7/2019.    Death Date: 10/07/19   Death Time: 1050         Pronounced By (RN1): Rashmi Holland  Pronounced By (RN2): Fernando Eddy

## 2019-10-10 NOTE — DOCUMENTATION QUERY
[facility]                                                                       Query Response Note      PATIENT:               ELEN MLCAIN  ACCT #:                  [id number]  MRN:                     [id number]  :                      1945  ADMIT DATE:       10/4/2019 3:36 AM  DISCH DATE:        10/7/2019 10:50 AM  RESPONDING  PROVIDER #:        [id number]           QUERY TEXT:    Per the medical record there appears to be conflicting information regarding the type of Congestive Heart Failure. Can this diagnosis be further clarified?      NOTE:  If an appropriate response is not listed below, please respond with a new note.    The patient's Clinical Indicators include:  Per Progress Notes  -with history of diastolic heart failure  -Echo found to be diastolic heart failure, with apical hypokinesis and EF of 55%  -Acute combined systolic and diastolic congestive heart failure (HCC)- (present on admission)    Echo:   -EF 55%  -Grade II diastolic dysfunction  -Hypokinesis of apical septum  -Mild mitral regurgitation  -Moderate arotic insufficiency  -Estimated RVSP 80 mmHg    Treatment:   Echocardiogram, CXR, BNP, IV Lasix, nitroglycerin patch, I&O, daily weight, BiPAP, supplemental O2, RT protocol, & comfort measures    Risk Factors:   Paroxysmal atrial fibrillation, acute respiratory failure, COPD, pulmonary hypertension, & demand ischemia,  Options provided:   -- Acute on Chronic Diastolic heart failure   -- Acute on Chronic Systolic heart failure   -- Acute on Chronic Systolic and Diastolic heart failure   -- Unable to determine      Query created by: Rashmi Kearney on 10/8/2019 10:46 AM    RESPONSE TEXT:    Acute on Chronic Diastolic heart failure       QUERY TEXT:    Tachycardia, tachypnea, elevated WBC's, and aspiration pneumonia are documented in the Medical Record. Please clarify whether:    NOTE:  If an appropriate response  is not listed below, please respond with a new note.    The patient's Clinical Indicators include:  WBC 10.6, 17.6, 15.0, 15.9  Lactic acid 1.3  Procalcitonin 7.53  CTA Chest: 4.  Atelectasis/consolidation noted involving bilateral lower lobes and right upper lobe.    Vital Sign Range:   Temp Max 99.1, Min 96.9  HR Max 143, Min 71  BP Max 169/99, Min 105/63  RR Max 38, Min 14    Treatment:   Admission to ICU, Unasyn, doxycycline, LR bolus 500ml, DuoNebs, BiPAP, supplemental O2, & comfort measures    Risk Factors:   Aspiration pneumonia, acute respiratory failure, & lung cancer with metastasis to brain  Options provided:   -- Severe Sepsis with acute respiratory failure; developed after admission   -- Sepsis without acute organ dysfunction; developed after admission   -- Severe Sepsis with acute respiratory failure; present on admission   -- Sepsis without acute organ dysfunction; present on admission   -- SIRS of non-infectious origin with acute respiratory failure; Sepsis has been ruled out   -- SIRS of non-infectious origin without acute organ dysfunction; Sepsis has been ruled out   -- Sepsis/SIRS has been ruled out   -- Findings of no clinical significance   -- Unable to determine      Query created by: Rashmi Kearney on 10/8/2019 11:11 AM    RESPONSE TEXT:    Sepsis/SIRS has been ruled out          Electronically signed by:  RAMY LUI 10/9/2019 9:27 PM